# Patient Record
Sex: FEMALE | Race: WHITE | NOT HISPANIC OR LATINO | URBAN - METROPOLITAN AREA
[De-identification: names, ages, dates, MRNs, and addresses within clinical notes are randomized per-mention and may not be internally consistent; named-entity substitution may affect disease eponyms.]

---

## 2019-06-25 PROBLEM — Z00.00 ENCOUNTER FOR PREVENTIVE HEALTH EXAMINATION: Status: ACTIVE | Noted: 2019-06-25

## 2022-07-12 ENCOUNTER — EMERGENCY (EMERGENCY)
Facility: HOSPITAL | Age: 67
LOS: 1 days | Discharge: HOME | End: 2022-07-12
Attending: EMERGENCY MEDICINE
Payer: MEDICARE

## 2022-07-12 VITALS
WEIGHT: 104.06 LBS | OXYGEN SATURATION: 98 % | SYSTOLIC BLOOD PRESSURE: 141 MMHG | TEMPERATURE: 97 F | HEART RATE: 84 BPM | RESPIRATION RATE: 16 BRPM | DIASTOLIC BLOOD PRESSURE: 80 MMHG | HEIGHT: 70 IN

## 2022-07-12 PROCEDURE — 99284 EMERGENCY DEPT VISIT MOD MDM: CPT

## 2022-07-12 RX ORDER — ACETAMINOPHEN 500 MG
650 TABLET ORAL ONCE
Refills: 0 | Status: COMPLETED | OUTPATIENT
Start: 2022-07-12 | End: 2022-07-12

## 2022-07-12 RX ADMIN — Medication 650 MILLIGRAM(S): at 23:46

## 2022-07-12 RX ADMIN — Medication 650 MILLIGRAM(S): at 23:16

## 2022-07-12 NOTE — ED PROVIDER NOTE - OBJECTIVE STATEMENT
67 y.o. female Adult Home female, pt claims was standing up taking off her blouse, pt lost her balance & fell backward onto the linoleum.  c/o Rt sided lower back pain, pt's roommate helped pt up.  Pt also Rt upper arm hematoma, Rt thigh pain.  Pt's able to walk with a walker, pt denies head injury, no LOC

## 2022-07-12 NOTE — ED PROVIDER NOTE - PROGRESS NOTE DETAILS
pt with acute Rt transverse processes of L2-L3, pt states she is currently receiving PT.  Pt declines Ibuprofen/Percocet.  Will d/c to [facility]

## 2022-07-12 NOTE — ED ADULT TRIAGE NOTE - CHIEF COMPLAINT QUOTE
patient states " I fell while I was taking off my shirt, I fell on my side "  denies head injury. denies dizziness

## 2022-07-12 NOTE — ED PROVIDER NOTE - NSICDXPASTMEDICALHX_GEN_ALL_CORE_FT
PAST MEDICAL HISTORY:  GERD (gastroesophageal reflux disease)     H/O personality disorder     H/O: depression     Osteoporosis

## 2022-07-12 NOTE — ED PROVIDER NOTE - PATIENT PORTAL LINK FT
You can access the FollowMyHealth Patient Portal offered by [facility] by registering at the following website: [email or web address]. By joining [employer]’s FollowMyHealth portal, you will also be able to view your health information using other applications (apps) compatible with our system.

## 2022-07-12 NOTE — ED PROVIDER NOTE - MUSCULOSKELETAL, MLM
Spine appears kyphotic, range of motion is not limited, Rt mid/lower back- abrasion, tenderness to palp., mid lower back- sl tenderness to palp., ? Rt CVAT

## 2022-07-12 NOTE — ED PROVIDER NOTE - PHYSICAL EXAMINATION
rt distal/medial aspect upper arm-hematoma, FROM  Rt tight- mid/medial aspect- tenderness to palp., rt distal/medial aspect upper arm-hematoma, FROM  Rt tight- mid/medial aspect-sl. tenderness to palp., not firm to touch

## 2022-07-12 NOTE — ED PROVIDER NOTE - NS ED ATTENDING STATEMENT MOD
Patient transported to room 229 via bed.  Bedside report of procedure and plan of care discussed with JERILYN Donald.  Questions encouraged and answered.  RN in agreement with plan.  Assessment of dressing to left radial site: clean, dry and intact.     Attending Only

## 2022-07-13 VITALS
OXYGEN SATURATION: 97 % | HEART RATE: 83 BPM | DIASTOLIC BLOOD PRESSURE: 78 MMHG | RESPIRATION RATE: 20 BRPM | SYSTOLIC BLOOD PRESSURE: 139 MMHG | TEMPERATURE: 98 F

## 2022-07-13 LAB
APPEARANCE UR: CLEAR — SIGNIFICANT CHANGE UP
BACTERIA # UR AUTO: ABNORMAL /HPF
BILIRUB UR-MCNC: NEGATIVE — SIGNIFICANT CHANGE UP
COLOR SPEC: YELLOW — SIGNIFICANT CHANGE UP
DIFF PNL FLD: ABNORMAL
EPI CELLS # UR: SIGNIFICANT CHANGE UP /HPF
GLUCOSE UR QL: NEGATIVE — SIGNIFICANT CHANGE UP
HYALINE CASTS # UR AUTO: ABNORMAL /LPF
KETONES UR-MCNC: NEGATIVE — SIGNIFICANT CHANGE UP
LEUKOCYTE ESTERASE UR-ACNC: ABNORMAL
NITRITE UR-MCNC: NEGATIVE — SIGNIFICANT CHANGE UP
PH UR: 6 — SIGNIFICANT CHANGE UP (ref 5–8)
PROT UR-MCNC: 15
RBC CASTS # UR COMP ASSIST: ABNORMAL /HPF (ref 0–2)
SP GR SPEC: 1.02 — SIGNIFICANT CHANGE UP (ref 1.01–1.02)
UROBILINOGEN FLD QL: NEGATIVE — SIGNIFICANT CHANGE UP
WBC UR QL: SIGNIFICANT CHANGE UP /HPF (ref 0–5)

## 2022-07-13 PROCEDURE — 72131 CT LUMBAR SPINE W/O DYE: CPT | Mod: MA

## 2022-07-13 PROCEDURE — 72131 CT LUMBAR SPINE W/O DYE: CPT | Mod: 26,MA

## 2022-07-13 PROCEDURE — 81001 URINALYSIS AUTO W/SCOPE: CPT

## 2022-07-13 PROCEDURE — 87086 URINE CULTURE/COLONY COUNT: CPT

## 2022-07-13 PROCEDURE — 73552 X-RAY EXAM OF FEMUR 2/>: CPT | Mod: 26,RT

## 2022-07-13 PROCEDURE — 73552 X-RAY EXAM OF FEMUR 2/>: CPT

## 2022-07-13 PROCEDURE — 87186 SC STD MICRODIL/AGAR DIL: CPT

## 2022-07-13 PROCEDURE — 99284 EMERGENCY DEPT VISIT MOD MDM: CPT | Mod: 25

## 2022-07-13 RX ORDER — ACETAMINOPHEN 500 MG
650 TABLET ORAL ONCE
Refills: 0 | Status: COMPLETED | OUTPATIENT
Start: 2022-07-13 | End: 2022-07-13

## 2022-07-13 RX ADMIN — Medication 650 MILLIGRAM(S): at 05:01

## 2023-03-03 ENCOUNTER — EMERGENCY (EMERGENCY)
Facility: HOSPITAL | Age: 68
LOS: 1 days | Discharge: HOME | End: 2023-03-03
Attending: EMERGENCY MEDICINE
Payer: MEDICARE

## 2023-03-03 VITALS — DIASTOLIC BLOOD PRESSURE: 85 MMHG | SYSTOLIC BLOOD PRESSURE: 156 MMHG

## 2023-03-03 VITALS
SYSTOLIC BLOOD PRESSURE: 129 MMHG | TEMPERATURE: 98 F | HEART RATE: 85 BPM | WEIGHT: 100.09 LBS | RESPIRATION RATE: 18 BRPM | OXYGEN SATURATION: 97 % | HEIGHT: 70 IN | DIASTOLIC BLOOD PRESSURE: 69 MMHG

## 2023-03-03 PROBLEM — Z86.59 PERSONAL HISTORY OF OTHER MENTAL AND BEHAVIORAL DISORDERS: Chronic | Status: ACTIVE | Noted: 2022-07-12

## 2023-03-03 PROBLEM — M81.0 AGE-RELATED OSTEOPOROSIS WITHOUT CURRENT PATHOLOGICAL FRACTURE: Chronic | Status: ACTIVE | Noted: 2022-07-12

## 2023-03-03 PROBLEM — K21.9 GASTRO-ESOPHAGEAL REFLUX DISEASE WITHOUT ESOPHAGITIS: Chronic | Status: ACTIVE | Noted: 2022-07-12

## 2023-03-03 LAB
ALBUMIN SERPL ELPH-MCNC: 3.4 G/DL — LOW (ref 3.5–5)
ALP SERPL-CCNC: 91 U/L — SIGNIFICANT CHANGE UP (ref 40–120)
ALT FLD-CCNC: 19 U/L DA — SIGNIFICANT CHANGE UP (ref 10–60)
ANION GAP SERPL CALC-SCNC: 1 MMOL/L — LOW (ref 5–17)
AST SERPL-CCNC: 22 U/L — SIGNIFICANT CHANGE UP (ref 10–40)
BASOPHILS # BLD AUTO: 0.03 K/UL — SIGNIFICANT CHANGE UP (ref 0–0.2)
BASOPHILS NFR BLD AUTO: 0.6 % — SIGNIFICANT CHANGE UP (ref 0–2)
BILIRUB SERPL-MCNC: 0.3 MG/DL — SIGNIFICANT CHANGE UP (ref 0.2–1.2)
BUN SERPL-MCNC: 21 MG/DL — HIGH (ref 7–18)
CALCIUM SERPL-MCNC: 9.2 MG/DL — SIGNIFICANT CHANGE UP (ref 8.4–10.5)
CHLORIDE SERPL-SCNC: 111 MMOL/L — HIGH (ref 96–108)
CO2 SERPL-SCNC: 30 MMOL/L — SIGNIFICANT CHANGE UP (ref 22–31)
CREAT SERPL-MCNC: 0.64 MG/DL — SIGNIFICANT CHANGE UP (ref 0.5–1.3)
EGFR: 96 ML/MIN/1.73M2 — SIGNIFICANT CHANGE UP
EOSINOPHIL # BLD AUTO: 0.15 K/UL — SIGNIFICANT CHANGE UP (ref 0–0.5)
EOSINOPHIL NFR BLD AUTO: 2.8 % — SIGNIFICANT CHANGE UP (ref 0–6)
FLUAV AG NPH QL: SIGNIFICANT CHANGE UP
FLUBV AG NPH QL: SIGNIFICANT CHANGE UP
GLUCOSE SERPL-MCNC: 98 MG/DL — SIGNIFICANT CHANGE UP (ref 70–99)
HCT VFR BLD CALC: 36 % — SIGNIFICANT CHANGE UP (ref 34.5–45)
HGB BLD-MCNC: 11.3 G/DL — LOW (ref 11.5–15.5)
IMM GRANULOCYTES NFR BLD AUTO: 0.4 % — SIGNIFICANT CHANGE UP (ref 0–0.9)
LYMPHOCYTES # BLD AUTO: 1.17 K/UL — SIGNIFICANT CHANGE UP (ref 1–3.3)
LYMPHOCYTES # BLD AUTO: 22.1 % — SIGNIFICANT CHANGE UP (ref 13–44)
MCHC RBC-ENTMCNC: 29.8 PG — SIGNIFICANT CHANGE UP (ref 27–34)
MCHC RBC-ENTMCNC: 31.4 GM/DL — LOW (ref 32–36)
MCV RBC AUTO: 95 FL — SIGNIFICANT CHANGE UP (ref 80–100)
MONOCYTES # BLD AUTO: 0.32 K/UL — SIGNIFICANT CHANGE UP (ref 0–0.9)
MONOCYTES NFR BLD AUTO: 6 % — SIGNIFICANT CHANGE UP (ref 2–14)
NEUTROPHILS # BLD AUTO: 3.61 K/UL — SIGNIFICANT CHANGE UP (ref 1.8–7.4)
NEUTROPHILS NFR BLD AUTO: 68.1 % — SIGNIFICANT CHANGE UP (ref 43–77)
NRBC # BLD: 0 /100 WBCS — SIGNIFICANT CHANGE UP (ref 0–0)
PLATELET # BLD AUTO: 155 K/UL — SIGNIFICANT CHANGE UP (ref 150–400)
POTASSIUM SERPL-MCNC: 3.9 MMOL/L — SIGNIFICANT CHANGE UP (ref 3.5–5.3)
POTASSIUM SERPL-SCNC: 3.9 MMOL/L — SIGNIFICANT CHANGE UP (ref 3.5–5.3)
PROT SERPL-MCNC: 7 G/DL — SIGNIFICANT CHANGE UP (ref 6–8.3)
RBC # BLD: 3.79 M/UL — LOW (ref 3.8–5.2)
RBC # FLD: 13 % — SIGNIFICANT CHANGE UP (ref 10.3–14.5)
SARS-COV-2 RNA SPEC QL NAA+PROBE: SIGNIFICANT CHANGE UP
SODIUM SERPL-SCNC: 142 MMOL/L — SIGNIFICANT CHANGE UP (ref 135–145)
WBC # BLD: 5.3 K/UL — SIGNIFICANT CHANGE UP (ref 3.8–10.5)
WBC # FLD AUTO: 5.3 K/UL — SIGNIFICANT CHANGE UP (ref 3.8–10.5)

## 2023-03-03 PROCEDURE — 99284 EMERGENCY DEPT VISIT MOD MDM: CPT

## 2023-03-03 PROCEDURE — 85025 COMPLETE CBC W/AUTO DIFF WBC: CPT

## 2023-03-03 PROCEDURE — 73590 X-RAY EXAM OF LOWER LEG: CPT

## 2023-03-03 PROCEDURE — 80053 COMPREHEN METABOLIC PANEL: CPT

## 2023-03-03 PROCEDURE — 87637 SARSCOV2&INF A&B&RSV AMP PRB: CPT

## 2023-03-03 PROCEDURE — 73590 X-RAY EXAM OF LOWER LEG: CPT | Mod: 26,RT

## 2023-03-03 PROCEDURE — 36415 COLL VENOUS BLD VENIPUNCTURE: CPT

## 2023-03-03 RX ADMIN — Medication 100 MILLIGRAM(S): at 18:53

## 2023-03-03 NOTE — ED ADULT NURSE NOTE - OBJECTIVE STATEMENT
Pt is here for right leg ulcer. Pt is from nursing home referred by Dr. Molina for admission. AAOx4. GCS15. Pt is ambulatory with a walker.

## 2023-03-03 NOTE — CHART NOTE - NSCHARTNOTEFT_GEN_A_CORE
Verbal referral " return to [facility]  Pt is a 67 year old female who was sent to the ED from [facility] with complaint of a non-healing ulcer in right lower extremity, anterior and lateral to the tibia. Pt was referred to the ED by Dr Molina Pt was treated and released from the ED. Per Dr Menendez, Pt's antibiotics will be sent to the facility tomorrow, Pt do not need the antibiotics tonight. Justen outreached the facility ([phone]) and informed Showanda of  Pt's d/c and transfer back to the facility. She was also informed that  Pt's  antibiotics would be sent to the facility tomorrow by Dr Molina.   Dr Molina updated. Verbal referral " return to [facility]  Pt is a 67 year old female who was sent to the ED from [facility] with complaint of a non-healing ulcer in right lower extremity, anterior and lateral to the tibia. Pt was referred to the ED by Dr Molina. Pt was treated and released from the ED. Per Dr Menendez, Pt's antibiotics will be sent to the facility tomorrow, Pt do not need the antibiotics tonight. Justen outreached the facility () and informed Showanda of  Pt's d/c and transfer back to the facility. She was also informed that  Pt's  antibiotics would be sent to the facility tomorrow by Dr Molina.   Dr Molina updated. Verbal referral " return to [facility]  Pt is a 67 year old female who was sent to the ED from [facility] with complaint of a non-healing ulcer in right lower extremity, anterior and lateral to the tibia. Pt was referred to the ED by Dr Molina. Pt was treated and released from the ED. Per Dr Menendez, Pt's antibiotics will be sent to the facility tomorrow, Pt do not need the antibiotics tonight. Justen outreached the facility (), informed Showanda of  Pt's d/c and transfer back to the facility. She was also informed that  Pt's  antibiotics would be sent to the facility tomorrow by Dr Molina.   Dr Molina updated.

## 2023-03-03 NOTE — ED ADULT NURSE NOTE - PRO INTERPRETER NEED 2
Patient was not available for their therapy session at this time. Pt approached for OT session. Pt encouraged to participate, however states, \"You're going to have to try tomorrow. It's too late today.\" Pt reports increased activity this date with PT and states he is significantly fatigued. Will re-attempt tomorrow. Reason not seen: Patient requesting no therapy today (11/05/18 1518).    Re-Attempt Plan: Will re-attempt tomorrow (11/05/18 1518).     English

## 2023-03-03 NOTE — ED PROVIDER NOTE - PATIENT PORTAL LINK FT
You can access the FollowMyHealth Patient Portal offered by [facility] by registering at the following website: [email or web address]. By joining [employer]’s FollowMyHealth portal, you will also be able to view your health information using other applications (apps) compatible with our system.

## 2023-03-03 NOTE — ED ADULT NURSE NOTE - NS_SISCREENINGSR_GEN_ALL_ED
Reason for Outgoing call:    Returning patient call.       Patients Questions/Concerns:    Patient is inquiring about the results of his CT scan.  He is also wanting to let Dr. Modi know that he has been running a of 102.0 - 101.0 which occurs mostly after eating but is resolved with Tylenol. Currently denies any fever and last recorded one was yesterday. He continues to have the night sweats where he is changing his under shirt 3-4 times a night. He denies any signs of infection or any new aches/pains.    Nursing Action/Patient Instruction:    Informed patient that Dr. Modi will be reviewing his scan tomorrow and that someone will be calling him with the results and plan.    Patient Response/Evaluation:    Patient verbalized understanding of plan and will look forward to hearing back from someone           Negative

## 2023-03-03 NOTE — ED PROVIDER NOTE - OBJECTIVE STATEMENT
67 year old female with no pertinent medical history presents to ED complaining of a non-healing ulcer in right lower extremity, anterior and lateral to the tibia. She reports sustaining an injury in Feb which had been healing, but the scab came off. She saw Dr Molina who referred her here for admission. OCTAVIA.

## 2023-03-15 ENCOUNTER — INPATIENT (INPATIENT)
Facility: HOSPITAL | Age: 68
LOS: 4 days | Discharge: NURSING, REHAB OR LONG-TERM CARE | DRG: 602 | End: 2023-03-20
Attending: INTERNAL MEDICINE | Admitting: INTERNAL MEDICINE
Payer: MEDICARE

## 2023-03-15 VITALS
OXYGEN SATURATION: 100 % | WEIGHT: 110.01 LBS | SYSTOLIC BLOOD PRESSURE: 124 MMHG | DIASTOLIC BLOOD PRESSURE: 81 MMHG | TEMPERATURE: 99 F | RESPIRATION RATE: 16 BRPM | HEART RATE: 87 BPM | HEIGHT: 70 IN

## 2023-03-15 DIAGNOSIS — L03.90 CELLULITIS, UNSPECIFIED: ICD-10-CM

## 2023-03-15 LAB
ALBUMIN SERPL ELPH-MCNC: 3.1 G/DL — LOW (ref 3.5–5)
ALP SERPL-CCNC: 92 U/L — SIGNIFICANT CHANGE UP (ref 40–120)
ALT FLD-CCNC: 15 U/L DA — SIGNIFICANT CHANGE UP (ref 10–60)
ANION GAP SERPL CALC-SCNC: 4 MMOL/L — LOW (ref 5–17)
AST SERPL-CCNC: 17 U/L — SIGNIFICANT CHANGE UP (ref 10–40)
BASOPHILS # BLD AUTO: 0.02 K/UL — SIGNIFICANT CHANGE UP (ref 0–0.2)
BASOPHILS NFR BLD AUTO: 0.3 % — SIGNIFICANT CHANGE UP (ref 0–2)
BILIRUB SERPL-MCNC: 0.4 MG/DL — SIGNIFICANT CHANGE UP (ref 0.2–1.2)
BUN SERPL-MCNC: 15 MG/DL — SIGNIFICANT CHANGE UP (ref 7–18)
CALCIUM SERPL-MCNC: 8.7 MG/DL — SIGNIFICANT CHANGE UP (ref 8.4–10.5)
CHLORIDE SERPL-SCNC: 108 MMOL/L — SIGNIFICANT CHANGE UP (ref 96–108)
CO2 SERPL-SCNC: 31 MMOL/L — SIGNIFICANT CHANGE UP (ref 22–31)
CREAT SERPL-MCNC: 0.63 MG/DL — SIGNIFICANT CHANGE UP (ref 0.5–1.3)
EGFR: 96 ML/MIN/1.73M2 — SIGNIFICANT CHANGE UP
EOSINOPHIL # BLD AUTO: 0.18 K/UL — SIGNIFICANT CHANGE UP (ref 0–0.5)
EOSINOPHIL NFR BLD AUTO: 3.1 % — SIGNIFICANT CHANGE UP (ref 0–6)
FLUAV AG NPH QL: SIGNIFICANT CHANGE UP
FLUBV AG NPH QL: SIGNIFICANT CHANGE UP
GLUCOSE SERPL-MCNC: 99 MG/DL — SIGNIFICANT CHANGE UP (ref 70–99)
HCT VFR BLD CALC: 35.2 % — SIGNIFICANT CHANGE UP (ref 34.5–45)
HGB BLD-MCNC: 10.9 G/DL — LOW (ref 11.5–15.5)
IMM GRANULOCYTES NFR BLD AUTO: 0.3 % — SIGNIFICANT CHANGE UP (ref 0–0.9)
LYMPHOCYTES # BLD AUTO: 1.83 K/UL — SIGNIFICANT CHANGE UP (ref 1–3.3)
LYMPHOCYTES # BLD AUTO: 31.4 % — SIGNIFICANT CHANGE UP (ref 13–44)
MCHC RBC-ENTMCNC: 29.3 PG — SIGNIFICANT CHANGE UP (ref 27–34)
MCHC RBC-ENTMCNC: 31 GM/DL — LOW (ref 32–36)
MCV RBC AUTO: 94.6 FL — SIGNIFICANT CHANGE UP (ref 80–100)
MONOCYTES # BLD AUTO: 0.37 K/UL — SIGNIFICANT CHANGE UP (ref 0–0.9)
MONOCYTES NFR BLD AUTO: 6.4 % — SIGNIFICANT CHANGE UP (ref 2–14)
NEUTROPHILS # BLD AUTO: 3.4 K/UL — SIGNIFICANT CHANGE UP (ref 1.8–7.4)
NEUTROPHILS NFR BLD AUTO: 58.5 % — SIGNIFICANT CHANGE UP (ref 43–77)
NRBC # BLD: 0 /100 WBCS — SIGNIFICANT CHANGE UP (ref 0–0)
PLATELET # BLD AUTO: 177 K/UL — SIGNIFICANT CHANGE UP (ref 150–400)
POTASSIUM SERPL-MCNC: 3 MMOL/L — LOW (ref 3.5–5.3)
POTASSIUM SERPL-SCNC: 3 MMOL/L — LOW (ref 3.5–5.3)
PROT SERPL-MCNC: 6.8 G/DL — SIGNIFICANT CHANGE UP (ref 6–8.3)
RBC # BLD: 3.72 M/UL — LOW (ref 3.8–5.2)
RBC # FLD: 12.8 % — SIGNIFICANT CHANGE UP (ref 10.3–14.5)
SARS-COV-2 RNA SPEC QL NAA+PROBE: SIGNIFICANT CHANGE UP
SODIUM SERPL-SCNC: 143 MMOL/L — SIGNIFICANT CHANGE UP (ref 135–145)
WBC # BLD: 5.82 K/UL — SIGNIFICANT CHANGE UP (ref 3.8–10.5)
WBC # FLD AUTO: 5.82 K/UL — SIGNIFICANT CHANGE UP (ref 3.8–10.5)

## 2023-03-15 PROCEDURE — 99285 EMERGENCY DEPT VISIT HI MDM: CPT

## 2023-03-15 RX ORDER — CEFAZOLIN SODIUM 1 G
1000 VIAL (EA) INJECTION ONCE
Refills: 0 | Status: COMPLETED | OUTPATIENT
Start: 2023-03-15 | End: 2023-03-15

## 2023-03-15 NOTE — ED ADULT NURSE NOTE - CHIEF COMPLAINT QUOTE
As per EMS report, sent from [facility] pt, c/o BL LE redness w/ swelling and L foot 5th digit pain w/ serosanguinous drainage noted today. RLE and L foot 5th digit bandage w/ serosanguinous drainage noted. Pt denies all other symptoms.

## 2023-03-15 NOTE — ED PROVIDER NOTE - CLINICAL SUMMARY MEDICAL DECISION MAKING FREE TEXT BOX
67-year-old female presenting with bilateral leg redness.  EXTR lower extremities appear cellulitic.  Patient reports she was sent to the hospital for admission and appears to have failed outpatient treatment for cellulitis.  Will get labs and give antibiotics.  Will reassess. will consult Dr. Bach for dispo. 67-year-old female presenting with bilateral leg redness.  EXTR lower extremities appear cellulitic.  Patient reports she was sent to the hospital for admission and appears to have failed outpatient treatment for cellulitis.  Will get labs and give antibiotics.  Will reassess. will consult Dr. Bach for dispo.    spoke with Dr. Bach. patient to be admitted for vascular workup.

## 2023-03-15 NOTE — ED PROVIDER NOTE - PHYSICAL EXAMINATION
General: well appearing female, no acute distress   HEENT: normocephalic, atraumatic   Respiratory: normal work of breathing  MSK: bilateral leg erythema, serosanguinous drainage from left 2nd toe and right calf, no edema,  non-tender    Skin: warm, dry   Neuro: A&Ox3  Psych: appropriate affect

## 2023-03-15 NOTE — ED ADULT NURSE NOTE - OBJECTIVE STATEMENT
pt alert and oriented able to make all needs known, rr even and unlabored, noted redness on bilateral LE, abdomen non distended, pt is continent, a,mbulatory with walker at baseline

## 2023-03-15 NOTE — ED ADULT TRIAGE NOTE - CHIEF COMPLAINT QUOTE
As per EMS report, sent from [facility] pt, c/o L foot 5th digit pain w/ serosanguinous drainage noted today. RLE and L foot 5th digit bandage w/ serosanguinous drainage noted. Pt denies all other symptoms. As per EMS report, sent from [facility] pt, c/o BL LE redness w/ swelling and L foot 5th digit pain w/ serosanguinous drainage noted today. RLE and L foot 5th digit bandage w/ serosanguinous drainage noted. Pt denies all other symptoms.

## 2023-03-15 NOTE — PHARMACOTHERAPY INTERVENTION NOTE - COMMENTS
Patient is from [facility] ([address] [facility]) and its medical record was used to update the outside medication record.

## 2023-03-15 NOTE — ED PROVIDER NOTE - OBJECTIVE STATEMENT
67-year-old female presenting with bilateral leg pain, redness and swelling.  Patient reports recent recently treated for cellulitis but symptoms not improved.  Denies any fever, chest pain, trouble breathing.

## 2023-03-16 DIAGNOSIS — M81.0 AGE-RELATED OSTEOPOROSIS WITHOUT CURRENT PATHOLOGICAL FRACTURE: ICD-10-CM

## 2023-03-16 DIAGNOSIS — K21.9 GASTRO-ESOPHAGEAL REFLUX DISEASE WITHOUT ESOPHAGITIS: ICD-10-CM

## 2023-03-16 DIAGNOSIS — Z29.9 ENCOUNTER FOR PROPHYLACTIC MEASURES, UNSPECIFIED: ICD-10-CM

## 2023-03-16 DIAGNOSIS — E87.6 HYPOKALEMIA: ICD-10-CM

## 2023-03-16 DIAGNOSIS — L03.90 CELLULITIS, UNSPECIFIED: ICD-10-CM

## 2023-03-16 LAB
ANION GAP SERPL CALC-SCNC: 7 MMOL/L — SIGNIFICANT CHANGE UP (ref 5–17)
BUN SERPL-MCNC: 12 MG/DL — SIGNIFICANT CHANGE UP (ref 7–18)
CALCIUM SERPL-MCNC: 8.8 MG/DL — SIGNIFICANT CHANGE UP (ref 8.4–10.5)
CHLORIDE SERPL-SCNC: 107 MMOL/L — SIGNIFICANT CHANGE UP (ref 96–108)
CO2 SERPL-SCNC: 30 MMOL/L — SIGNIFICANT CHANGE UP (ref 22–31)
CREAT SERPL-MCNC: 0.57 MG/DL — SIGNIFICANT CHANGE UP (ref 0.5–1.3)
EGFR: 98 ML/MIN/1.73M2 — SIGNIFICANT CHANGE UP
GLUCOSE SERPL-MCNC: 103 MG/DL — HIGH (ref 70–99)
HCT VFR BLD CALC: 35.6 % — SIGNIFICANT CHANGE UP (ref 34.5–45)
HGB BLD-MCNC: 11.2 G/DL — LOW (ref 11.5–15.5)
MCHC RBC-ENTMCNC: 29.6 PG — SIGNIFICANT CHANGE UP (ref 27–34)
MCHC RBC-ENTMCNC: 31.5 GM/DL — LOW (ref 32–36)
MCV RBC AUTO: 93.9 FL — SIGNIFICANT CHANGE UP (ref 80–100)
MRSA PCR RESULT.: SIGNIFICANT CHANGE UP
NRBC # BLD: 0 /100 WBCS — SIGNIFICANT CHANGE UP (ref 0–0)
PLATELET # BLD AUTO: 201 K/UL — SIGNIFICANT CHANGE UP (ref 150–400)
POTASSIUM SERPL-MCNC: 3.7 MMOL/L — SIGNIFICANT CHANGE UP (ref 3.5–5.3)
POTASSIUM SERPL-SCNC: 3.7 MMOL/L — SIGNIFICANT CHANGE UP (ref 3.5–5.3)
RBC # BLD: 3.79 M/UL — LOW (ref 3.8–5.2)
RBC # FLD: 12.7 % — SIGNIFICANT CHANGE UP (ref 10.3–14.5)
S AUREUS DNA NOSE QL NAA+PROBE: SIGNIFICANT CHANGE UP
SODIUM SERPL-SCNC: 144 MMOL/L — SIGNIFICANT CHANGE UP (ref 135–145)
WBC # BLD: 5.52 K/UL — SIGNIFICANT CHANGE UP (ref 3.8–10.5)
WBC # FLD AUTO: 5.52 K/UL — SIGNIFICANT CHANGE UP (ref 3.8–10.5)

## 2023-03-16 PROCEDURE — 93923 UPR/LXTR ART STDY 3+ LVLS: CPT | Mod: 26

## 2023-03-16 RX ORDER — LANOLIN ALCOHOL/MO/W.PET/CERES
3 CREAM (GRAM) TOPICAL AT BEDTIME
Refills: 0 | Status: DISCONTINUED | OUTPATIENT
Start: 2023-03-16 | End: 2023-03-20

## 2023-03-16 RX ORDER — ENOXAPARIN SODIUM 100 MG/ML
40 INJECTION SUBCUTANEOUS EVERY 24 HOURS
Refills: 0 | Status: DISCONTINUED | OUTPATIENT
Start: 2023-03-16 | End: 2023-03-20

## 2023-03-16 RX ORDER — PANTOPRAZOLE SODIUM 20 MG/1
40 TABLET, DELAYED RELEASE ORAL
Refills: 0 | Status: DISCONTINUED | OUTPATIENT
Start: 2023-03-16 | End: 2023-03-20

## 2023-03-16 RX ORDER — ASPIRIN/CALCIUM CARB/MAGNESIUM 324 MG
81 TABLET ORAL DAILY
Refills: 0 | Status: DISCONTINUED | OUTPATIENT
Start: 2023-03-16 | End: 2023-03-20

## 2023-03-16 RX ORDER — AMPICILLIN SODIUM AND SULBACTAM SODIUM 250; 125 MG/ML; MG/ML
3 INJECTION, POWDER, FOR SUSPENSION INTRAMUSCULAR; INTRAVENOUS EVERY 6 HOURS
Refills: 0 | Status: DISCONTINUED | OUTPATIENT
Start: 2023-03-16 | End: 2023-03-20

## 2023-03-16 RX ORDER — POTASSIUM CHLORIDE 20 MEQ
40 PACKET (EA) ORAL EVERY 4 HOURS
Refills: 0 | Status: COMPLETED | OUTPATIENT
Start: 2023-03-16 | End: 2023-03-16

## 2023-03-16 RX ORDER — CHOLECALCIFEROL (VITAMIN D3) 125 MCG
1000 CAPSULE ORAL DAILY
Refills: 0 | Status: DISCONTINUED | OUTPATIENT
Start: 2023-03-16 | End: 2023-03-20

## 2023-03-16 RX ORDER — ONDANSETRON 8 MG/1
4 TABLET, FILM COATED ORAL EVERY 8 HOURS
Refills: 0 | Status: DISCONTINUED | OUTPATIENT
Start: 2023-03-16 | End: 2023-03-20

## 2023-03-16 RX ORDER — CEFAZOLIN SODIUM 1 G
1000 VIAL (EA) INJECTION EVERY 8 HOURS
Refills: 0 | Status: DISCONTINUED | OUTPATIENT
Start: 2023-03-16 | End: 2023-03-16

## 2023-03-16 RX ORDER — ACETAMINOPHEN 500 MG
650 TABLET ORAL EVERY 6 HOURS
Refills: 0 | Status: DISCONTINUED | OUTPATIENT
Start: 2023-03-16 | End: 2023-03-20

## 2023-03-16 RX ORDER — COLLAGENASE CLOSTRIDIUM HIST. 250 UNIT/G
1 OINTMENT (GRAM) TOPICAL DAILY
Refills: 0 | Status: DISCONTINUED | OUTPATIENT
Start: 2023-03-16 | End: 2023-03-20

## 2023-03-16 RX ADMIN — Medication 1 TABLET(S): at 12:03

## 2023-03-16 RX ADMIN — AMPICILLIN SODIUM AND SULBACTAM SODIUM 200 GRAM(S): 250; 125 INJECTION, POWDER, FOR SUSPENSION INTRAMUSCULAR; INTRAVENOUS at 12:04

## 2023-03-16 RX ADMIN — Medication 81 MILLIGRAM(S): at 12:03

## 2023-03-16 RX ADMIN — ENOXAPARIN SODIUM 40 MILLIGRAM(S): 100 INJECTION SUBCUTANEOUS at 07:04

## 2023-03-16 RX ADMIN — PANTOPRAZOLE SODIUM 40 MILLIGRAM(S): 20 TABLET, DELAYED RELEASE ORAL at 07:04

## 2023-03-16 RX ADMIN — Medication 40 MILLIEQUIVALENT(S): at 12:02

## 2023-03-16 RX ADMIN — AMPICILLIN SODIUM AND SULBACTAM SODIUM 200 GRAM(S): 250; 125 INJECTION, POWDER, FOR SUSPENSION INTRAMUSCULAR; INTRAVENOUS at 23:39

## 2023-03-16 RX ADMIN — Medication 1000 UNIT(S): at 13:01

## 2023-03-16 RX ADMIN — AMPICILLIN SODIUM AND SULBACTAM SODIUM 200 GRAM(S): 250; 125 INJECTION, POWDER, FOR SUSPENSION INTRAMUSCULAR; INTRAVENOUS at 17:32

## 2023-03-16 RX ADMIN — Medication 40 MILLIEQUIVALENT(S): at 07:04

## 2023-03-16 RX ADMIN — Medication 1 APPLICATION(S): at 23:41

## 2023-03-16 NOTE — H&P ADULT - NSVTERISKREFERASSESS_GEN_ALL_CORE
Refer to the Assessment tab to view/cancel completed assessment. Double O-Z Plasty Text: The defect edges were debeveled with a #15 scalpel blade.  Given the location of the defect, shape of the defect and the proximity to free margins a Double O-Z plasty (double transposition flap) was deemed most appropriate.  Using a sterile surgical marker, the appropriate transposition flaps were drawn incorporating the defect and placing the expected incisions within the relaxed skin tension lines where possible. The area thus outlined was incised deep to adipose tissue with a #15 scalpel blade.  The skin margins were undermined to an appropriate distance in all directions utilizing iris scissors.  Hemostasis was achieved with electrocautery.  The flaps were then transposed into place, one clockwise and the other counterclockwise, and anchored with interrupted buried subcutaneous sutures.

## 2023-03-16 NOTE — CONSULT NOTE ADULT - SKIN COMMENTS
erythema , warmth and tenderness over the mid right  lower leg , 2 superficial ulcers over the area which is the likely entry point of her infection

## 2023-03-16 NOTE — PROGRESS NOTE ADULT - SUBJECTIVE AND OBJECTIVE BOX
NP Note discussed with  primary attending    Patient is a 67y old  Female who presents with a chief complaint of Cellulitis (16 Mar 2023 01:39)      INTERVAL HPI/OVERNIGHT EVENTS: no new complaints    MEDICATIONS  (STANDING):  ampicillin/sulbactam  IVPB 3 Gram(s) IV Intermittent every 6 hours  aspirin enteric coated 81 milliGRAM(s) Oral daily  cholecalciferol 1000 Unit(s) Oral daily  collagenase Ointment 1 Application(s) Topical daily  enoxaparin Injectable 40 milliGRAM(s) SubCutaneous every 24 hours  multivitamin 1 Tablet(s) Oral daily  pantoprazole    Tablet 40 milliGRAM(s) Oral before breakfast    MEDICATIONS  (PRN):  acetaminophen     Tablet .. 650 milliGRAM(s) Oral every 6 hours PRN Temp greater or equal to 38C (100.4F), Mild Pain (1 - 3)  aluminum hydroxide/magnesium hydroxide/simethicone Suspension 30 milliLiter(s) Oral every 4 hours PRN Dyspepsia  melatonin 3 milliGRAM(s) Oral at bedtime PRN Insomnia  ondansetron Injectable 4 milliGRAM(s) IV Push every 8 hours PRN Nausea and/or Vomiting      __________________________________________________  REVIEW OF SYSTEMS:    CONSTITUTIONAL: No fever,   EYES: no acute visual disturbances  NECK: No pain or stiffness  RESPIRATORY: No cough; No shortness of breath  CARDIOVASCULAR: No chest pain, no palpitations  GASTROINTESTINAL: No pain. No nausea or vomiting; No diarrhea   NEUROLOGICAL: No headache or numbness, no tremors  MUSCULOSKELETAL: No joint pain, no muscle pain  GENITOURINARY: no dysuria, no frequency, no hesitancy  PSYCHIATRY: no depression , no anxiety  ALL OTHER  ROS negative        Vital Signs Last 24 Hrs  T(C): 36.8 (16 Mar 2023 13:32), Max: 37.2 (15 Mar 2023 16:48)  T(F): 98.2 (16 Mar 2023 13:32), Max: 98.9 (15 Mar 2023 16:48)  HR: 87 (16 Mar 2023 13:32) (68 - 114)  BP: 116/77 (16 Mar 2023 13:32) (116/77 - 138/75)  BP(mean): --  RR: 18 (16 Mar 2023 13:32) (16 - 18)  SpO2: 99% (16 Mar 2023 13:32) (98% - 100%)    Parameters below as of 16 Mar 2023 13:32  Patient On (Oxygen Delivery Method): room air        ________________________________________________  PHYSICAL EXAM:  GENERAL: NAD  HEENT: Normocephalic;  conjunctivae and sclerae clear; moist mucous membranes;   NECK : supple  CHEST/LUNG: Clear to ausculitation bilaterally with good air entry   HEART: S1 S2  regular; no murmurs, gallops or rubs  ABDOMEN: Soft, Nontender, Nondistended; Bowel sounds present  EXTREMITIES: B/L LE ulcers, redness   SKIN: warm and dry; LE cellulitis   NERVOUS SYSTEM:  Awake and alert; Oriented  to place, person and time ; anxious     _________________________________________________  LABS:                        11.2   5.52  )-----------( 201      ( 16 Mar 2023 12:26 )             35.6     03-16    144  |  107  |  12  ----------------------------<  103<H>  3.7   |  30  |  0.57    Ca    8.8      16 Mar 2023 12:26    TPro  6.8  /  Alb  3.1<L>  /  TBili  0.4  /  DBili  x   /  AST  17  /  ALT  15  /  AlkPhos  92  03-15        CAPILLARY BLOOD GLUCOSE            RADIOLOGY & ADDITIONAL TESTS:    < from: VA Physiol Extremity Lower 3+ Level, BI (03.16.23 @ 11:24) >  ACC: [id number] EXAM:  US PHYSIOL LWR EXT 3+ LEV BI   ORDERED BY: DEVONTE ZAMAN     PROCEDURE DATE:  03/16/2023          INTERPRETATION:  Clinical information: Nonsmoker, nondiabetic, presents   with cellulitis and nonhealing ulcer left lower extremity.    COMPARISON: None available.    TECHNIQUE: Lower extremity arterial Doppler study.    FINDINGS: Ankle brachial index measures 1.26 on the right and 1.21 on the   left. No segmental arterial pressure gradient is detected.    PVR waveforms are normal in amplitude and pulsatility throughout both   lower extremities.    IMPRESSION: No Doppler evidence of hemodynamically significant arterial   flow limitation in either lower extremity.    < end of copied text >  < from: Xray Tibia + Fibula 2 Views, Right (03.03.23 @ 16:36) >  ACC: [id number] EXAM:  XR TIB FIB AP LAT 2 VIEWS RT   ORDERED BY: QUETA FRIEDMAN     PROCEDURE DATE:  03/03/2023          INTERPRETATION:  Radiographs of the RIGHT tibia and fibula    CLINICAL INFORMATION: RIGHT lower extremity soft tissue ulceration..    TECHNIQUE:  Frontal and lateral views of the tibia and fibula were   obtained.    FINDINGS:  No prior studies are available for review.    The tibia and fibula are intact.  No fracture or gross osseous lytic/ blastic lesion..  No subcutaneous air or radiopaque foreign body. No radiographic evidence   of osteomyelitis..    IMPRESSION:   No acute radiographic osseous pathology..      < end of copied text >      Imaging Personally Reviewed:  YES    Consultant(s) Notes Reviewed:   YES    Care Discussed with Consultants :     Plan of care was discussed with patient and /or primary care giver; all questions and concerns were addressed and care was aligned with patient's wishes.

## 2023-03-16 NOTE — CONSULT NOTE ADULT - SUBJECTIVE AND OBJECTIVE BOX
Podiatry HPI: Patient is a 67y old  Female who presents with a chief complaint of Cellulitis (16 Mar 2023 01:39). Patient reports no PMH except Anemia. She was referred to the ED because of a superficial wound to her right anteromedial distal leg. She is not sure how long the wound has been there. Patient also has a superficial wound to the 5th digit at her left foot. She admits no pain. No constitutional symptoms. No other pedal concerns. Patient does not like to have dressings on her feet or legs but agrees to a dressing application today.      HPI:  This is a 67 year old female, coming from [facility], with no significant medical history coming in for b/l LE ulcers. She states she has had chronic ulcers that have been getting worse. She was being treated with doxy in the assisted living but her cellulitis did not improve. She continues to have painful ulcers on the lower extremities She denies any headaches, visual disturbances, N/V/D, dizziness, falls, chest pain, palpitations, lower extremity swelling, fevers, skin rash, recent travel, or sick contacts   (16 Mar 2023 01:39)      PMH: GERD (gastroesophageal reflux disease)    H/O: depression    Osteoporosis    H/O personality disorder      Allergies: No Known Allergies    Medications: acetaminophen     Tablet .. 650 milliGRAM(s) Oral every 6 hours PRN  aluminum hydroxide/magnesium hydroxide/simethicone Suspension 30 milliLiter(s) Oral every 4 hours PRN  ampicillin/sulbactam  IVPB 3 Gram(s) IV Intermittent every 6 hours  aspirin enteric coated 81 milliGRAM(s) Oral daily  cholecalciferol 1000 Unit(s) Oral daily  collagenase Ointment 1 Application(s) Topical daily  enoxaparin Injectable 40 milliGRAM(s) SubCutaneous every 24 hours  melatonin 3 milliGRAM(s) Oral at bedtime PRN  multivitamin 1 Tablet(s) Oral daily  ondansetron Injectable 4 milliGRAM(s) IV Push every 8 hours PRN  pantoprazole    Tablet 40 milliGRAM(s) Oral before breakfast    FH:No pertinent family history in first degree relatives      PSX: No significant past surgical history      SH: acetaminophen     Tablet .. 650 milliGRAM(s) Oral every 6 hours PRN  aluminum hydroxide/magnesium hydroxide/simethicone Suspension 30 milliLiter(s) Oral every 4 hours PRN  ampicillin/sulbactam  IVPB 3 Gram(s) IV Intermittent every 6 hours  aspirin enteric coated 81 milliGRAM(s) Oral daily  cholecalciferol 1000 Unit(s) Oral daily  collagenase Ointment 1 Application(s) Topical daily  enoxaparin Injectable 40 milliGRAM(s) SubCutaneous every 24 hours  melatonin 3 milliGRAM(s) Oral at bedtime PRN  multivitamin 1 Tablet(s) Oral daily  ondansetron Injectable 4 milliGRAM(s) IV Push every 8 hours PRN  pantoprazole    Tablet 40 milliGRAM(s) Oral before breakfast      Vital Signs Last 24 Hrs  T(C): 36.8 (16 Mar 2023 13:32), Max: 37.2 (15 Mar 2023 16:48)  T(F): 98.2 (16 Mar 2023 13:32), Max: 98.9 (15 Mar 2023 16:48)  HR: 87 (16 Mar 2023 13:32) (68 - 114)  BP: 116/77 (16 Mar 2023 13:32) (116/77 - 138/75)  BP(mean): --  RR: 18 (16 Mar 2023 13:32) (16 - 18)  SpO2: 99% (16 Mar 2023 13:32) (98% - 100%)    Parameters below as of 16 Mar 2023 13:32  Patient On (Oxygen Delivery Method): room air        LABS                        11.2   5.52  )-----------( 201      ( 16 Mar 2023 12:26 )             35.6               03-16    144  |  107  |  12  ----------------------------<  103<H>  3.7   |  30  |  0.57    Ca    8.8      16 Mar 2023 12:26    TPro  6.8  /  Alb  3.1<L>  /  TBili  0.4  /  DBili  x   /  AST  17  /  ALT  15  /  AlkPhos  92  03-15      ROS  REVIEW OF SYSTEMS: pertinent information in Physical exam below    PHYSICAL EXAM  LE Focused:  bilateral exam  Vasc:  DP and PT pulses faintly palpable. TG: warm to cool. Multiple varicosities perimalleolar. No edema, no erythema   Derm: Right distal danielle-medial leg - fibrotic wound measuring about 3.5cmx1.6cmx0.1cm. No periwound erythema. No purulence/drainage/fluctuance  Left foot 5th digit - superficial lesion with a fibro-granular base measuring about [id number]. No periwound erythema/purulence/drainage/fluctuance  Neuro: protective sensation decreased  MSK: deferred at this time    IMAGING: pending      A:  Right distal leg superficial wound  Left foot 5th digit wound    P:   Patient evaluated and Chart reviewed   Discussed diagnosis and treatment with patient  Applied betadine with dry sterile dressing  Ordered Santyl, recommend Santyl application every other day  Ordered xrays  Continue with IV antibiotics As Per ID  Ordered MYESHA/PVR  Podiatry to follow while in house  Seen bedside and discussed with Attending Dr. Montalvo

## 2023-03-16 NOTE — H&P ADULT - NSHPREVIEWOFSYSTEMS_GEN_ALL_CORE
CONSTITUTIONAL: No fever, weight loss, or fatigue  RESPIRATORY: No cough, wheezing, chills or hemoptysis; No shortness of breath  CARDIOVASCULAR: No chest pain, palpitations, dizziness, or leg swelling  GASTROINTESTINAL: No abdominal pain. No nausea, vomiting, or hematemesis; No diarrhea or constipation. No melena or hematochezia.  GENITOURINARY: No dysuria or hematuria, urinary frequency  NEUROLOGICAL: No headaches, memory loss, loss of strength, numbness, or tremors  ENDOCRINE: No polyuria, polydipsia, or heat/cold intolerance  MUSCULOSKELETAL: No muscle aches, joint pains  HEME: no easy bruisability, no tender or enlarged lymph nodes  SKIN: Ulcers and redness on b/l LE.

## 2023-03-16 NOTE — PATIENT PROFILE ADULT - FALL HARM RISK - HARM RISK INTERVENTIONS

## 2023-03-16 NOTE — H&P ADULT - HISTORY OF PRESENT ILLNESS
This is a 67 year old female, coming from [facility], with no significant medical history coming in for b/l LE ulcers. She states she has had chronic ulcers that have been getting worse. She was being treated with doxy in the assisted living but her cellulitis did not improve. She continues to have painful ulcers on the lower extremities She denies any headaches, visual disturbances, N/V/D, dizziness, falls, chest pain, palpitations, lower extremity swelling, fevers, skin rash, recent travel, or sick contacts

## 2023-03-16 NOTE — H&P ADULT - PROBLEM SELECTOR PLAN 1
p/w swelling and pain of b/l Lower extremity  Started on abx: Ancef  F/U blood cultures  Podiatry consulted  vascular to be consulted in the AM. p/w swelling and pain of b/l Lower extremity  Started on abx: Unsayn  F/U blood cultures  Podiatry consulted  f/u MYESHA  Dr. Matta consulted

## 2023-03-16 NOTE — H&P ADULT - CONVERSATION DETAILS
An extensive goals of care conversation was held including options, risks and benefits of many medical treatments including CPR, intubation, and tube feeding. As per your best interests, you have been made DNR/DNI. A MOLST has been completed to this effect.

## 2023-03-16 NOTE — CONSULT NOTE ADULT - SUBJECTIVE AND OBJECTIVE BOX
HPI:  This is a 67 year old female, coming from [facility], with no significant medical history coming in for b/l LE ulcers. She states she has had chronic ulcers that have been getting worse. She was being treated with doxy in the assisted living but her cellulitis did not improve. She continues to have painful ulcers on the lower extremities She denies any headaches, visual disturbances, N/V/D, dizziness, falls, chest pain, palpitations, lower extremity swelling, fevers, skin rash, recent travel, or sick contacts   (16 Mar 2023 01:39)      PAST MEDICAL & SURGICAL HISTORY:  GERD (gastroesophageal reflux disease)      H/O: depression      Osteoporosis      H/O personality disorder      No significant past surgical history          No Known Allergies      Meds:  acetaminophen     Tablet .. 650 milliGRAM(s) Oral every 6 hours PRN  aluminum hydroxide/magnesium hydroxide/simethicone Suspension 30 milliLiter(s) Oral every 4 hours PRN  ampicillin/sulbactam  IVPB 3 Gram(s) IV Intermittent every 6 hours  aspirin enteric coated 81 milliGRAM(s) Oral daily  cholecalciferol 1000 Unit(s) Oral daily  collagenase Ointment 1 Application(s) Topical daily  enoxaparin Injectable 40 milliGRAM(s) SubCutaneous every 24 hours  melatonin 3 milliGRAM(s) Oral at bedtime PRN  multivitamin 1 Tablet(s) Oral daily  ondansetron Injectable 4 milliGRAM(s) IV Push every 8 hours PRN  pantoprazole    Tablet 40 milliGRAM(s) Oral before breakfast      SOCIAL HISTORY:  Smoker:  YES / NO        PACK YEARS:                         WHEN QUIT?  ETOH use:  YES / NO               FREQUENCY / QUANTITY:  [place]lic[place] Drug use:  YES / NO  Occupation:  Assisted device use (Cane / Walker):  Live with:    FAMILY HISTORY:  No pertinent family history in first degree relatives        VITALS:  Vital Signs Last 24 Hrs  T(C): 36.8 (16 Mar 2023 13:32), Max: 37.1 (16 Mar 2023 03:55)  T(F): 98.2 (16 Mar 2023 13:32), Max: 98.7 (16 Mar 2023 03:55)  HR: 87 (16 Mar 2023 13:32) (68 - 114)  BP: 116/77 (16 Mar 2023 13:32) (116/77 - 138/75)  BP(mean): --  RR: 18 (16 Mar 2023 13:32) (16 - 18)  SpO2: 99% (16 Mar 2023 13:32) (98% - 100%)    Parameters below as of 16 Mar 2023 13:32  Patient On (Oxygen Delivery Method): room air        LABS/DIAGNOSTIC TESTS:                          11.2   5.52  )-----------( 201      ( 16 Mar 2023 12:26 )             35.6     WBC Count: 5.52 K/uL (03-16 @ 12:26)  WBC Count: 5.82 K/uL (03-15 @ 21:26)      03-16    144  |  107  |  12  ----------------------------<  103<H>  3.7   |  30  |  0.57    Ca    8.8      16 Mar 2023 12:26    TPro  6.8  /  Alb  3.1<L>  /  TBili  0.4  /  DBili  x   /  AST  17  /  ALT  15  /  AlkPhos  92  03-15          LIVER FUNCTIONS - ( 15 Mar 2023 21:26 )  Alb: 3.1 g/dL / Pro: 6.8 g/dL / ALK PHOS: 92 U/L / ALT: 15 U/L DA / AST: 17 U/L / GGT: x                 LACTATE:    ABG -     CULTURES:       RADIOLOGY:< from: [facility] Physiol Extremity Lower 3+ Level, BI (03.16.23 @ 11:24) >  ACC: [id number] EXAM:  US PHYSIOL LWR EXT 3+ LEV BI   ORDERED BY: DEVONTE ZAMAN     PROCEDURE DATE:  03/16/2023          INTERPRETATION:  Clinical information: Nonsmoker, nondiabetic, presents   with cellulitis and nonhealing ulcer left lower extremity.    COMPARISON: None available.    TECHNIQUE: Lower extremity arterial Doppler study.    FINDINGS: Ankle brachial index measures 1.26 on the right and 1.21 on the   left. No segmental arterial pressure gradient is detected.    PVR waveforms are normal in amplitude and pulsatility throughout both   lower extremities.    IMPRESSION: No Doppler evidence of hemodynamically significant arterial   flow limitation in either lower extremity.    --- End of Report ---            IRISH CONCEPCION MD; AttendingRadiologist  This document has been electronically signed. Mar 16 2023 11:41AM    < end of copied text >  ----------------------------------------------------------------------------------------------------------------------------------------------------------------------------------------------------------------------  ACC: [id number] EXAM:  XR TIB FIB AP LAT 2 VIEWS RT   ORDERED BY: QUETA FRIEDMAN     PROCEDURE DATE:  03/03/2023          INTERPRETATION:  Radiographs of the RIGHT tibia and fibula    CLINICAL INFORMATION: RIGHT lower extremity soft tissue ulceration..    TECHNIQUE:  Frontal and lateral views of the tibia and fibula were   obtained.    FINDINGS:  No prior studies are available for review.    The tibia and fibula are intact.  No fracture or gross osseous lytic/ blastic lesion..  No subcutaneous air or radiopaque foreign body. No radiographic evidence   of osteomyelitis..    IMPRESSION:   No acute radiographic osseous pathology..    --- End of Report ---            ANGELA BEDOLLA MD; Attending Radiologist  This document has beenelectronically signed. Mar  3 2023  4:45PM    < end of copied text >        ROS  [  ] UNABLE TO ELICIT               HPI:  This is a 67 year old female, coming from [facility], with no significant medical history coming in for b/l LE ulcers. She states she has had chronic ulcers that have been getting worse. She was being treated with doxy in the assisted living but her cellulitis did not improve. She continues to have painful ulcers on the lower extremities She denies any headaches, visual disturbances, N/V/D, dizziness, falls, chest pain, palpitations, lower extremity swelling, fevers, skin rash, recent travel, or sick contacts   (16 Mar 2023 01:39)      History as above, asked to see this patient who is from an assisted living facility and has varicose veins and stasis dermatitis, she has right lower leg pain and swelling and discoloration , no fevers , chills or other complaints, she denies any trauma to the leg.        PAST MEDICAL & SURGICAL HISTORY:  GERD (gastroesophageal reflux disease)      H/O: depression      Osteoporosis      H/O personality disorder      No significant past surgical history          No Known Allergies      Meds:  acetaminophen     Tablet .. 650 milliGRAM(s) Oral every 6 hours PRN  aluminum hydroxide/magnesium hydroxide/simethicone Suspension 30 milliLiter(s) Oral every 4 hours PRN  ampicillin/sulbactam  IVPB 3 Gram(s) IV Intermittent every 6 hours  aspirin enteric coated 81 milliGRAM(s) Oral daily  cholecalciferol 1000 Unit(s) Oral daily  collagenase Ointment 1 Application(s) Topical daily  enoxaparin Injectable 40 milliGRAM(s) SubCutaneous every 24 hours  melatonin 3 milliGRAM(s) Oral at bedtime PRN  multivitamin 1 Tablet(s) Oral daily  ondansetron Injectable 4 milliGRAM(s) IV Push every 8 hours PRN  pantoprazole    Tablet 40 milliGRAM(s) Oral before breakfast      SOCIAL HISTORY:  Smoker: no  ETOH use:  no      FAMILY HISTORY:  No pertinent family history in first degree relatives        VITALS:  Vital Signs Last 24 Hrs  T(C): 36.8 (16 Mar 2023 13:32), Max: 37.1 (16 Mar 2023 03:55)  T(F): 98.2 (16 Mar 2023 13:32), Max: 98.7 (16 Mar 2023 03:55)  HR: 87 (16 Mar 2023 13:32) (68 - 114)  BP: 116/77 (16 Mar 2023 13:32) (116/77 - 138/75)  BP(mean): --  RR: 18 (16 Mar 2023 13:32) (16 - 18)  SpO2: 99% (16 Mar 2023 13:32) (98% - 100%)    Parameters below as of 16 Mar 2023 13:32  Patient On (Oxygen Delivery Method): room air        LABS/DIAGNOSTIC TESTS:                          11.2   5.52  )-----------( 201      ( 16 Mar 2023 12:26 )             35.6     WBC Count: 5.52 K/uL (03-16 @ 12:26)  WBC Count: 5.82 K/uL (03-15 @ 21:26)      03-16    144  |  107  |  12  ----------------------------<  103<H>  3.7   |  30  |  0.57    Ca    8.8      16 Mar 2023 12:26    TPro  6.8  /  Alb  3.1<L>  /  TBili  0.4  /  DBili  x   /  AST  17  /  ALT  15  /  AlkPhos  92  03-15          LIVER FUNCTIONS - ( 15 Mar 2023 21:26 )  Alb: 3.1 g/dL / Pro: 6.8 g/dL / ALK PHOS: 92 U/L / ALT: 15 U/L DA / AST: 17 U/L / GGT: x                 LACTATE:    ABG -     CULTURES:       RADIOLOGY:< from: [facility] Physiol Extremity Lower 3+ Level, BI (03.16.23 @ 11:24) >  ACC: [id number] EXAM:  US PHYSIOL LWR EXT 3+ LEV BI   ORDERED BY: DEVONTE ZAMAN     PROCEDURE DATE:  03/16/2023          INTERPRETATION:  Clinical information: Nonsmoker, nondiabetic, presents   with cellulitis and nonhealing ulcer left lower extremity.    COMPARISON: None available.    TECHNIQUE: Lower extremity arterial Doppler study.    FINDINGS: Ankle brachial index measures 1.26 on the right and 1.21 on the   left. No segmental arterial pressure gradient is detected.    PVR waveforms are normal in amplitude and pulsatility throughout both   lower extremities.    IMPRESSION: No Doppler evidence of hemodynamically significant arterial   flow limitation in either lower extremity.    --- End of Report ---            IRISH CONCEPCION MD; AttendingRadiologist  This document has been electronically signed. Mar 16 2023 11:41AM    < end of copied text >  ----------------------------------------------------------------------------------------------------------------------------------------------------------------------------------------------------------------------  ACC: [id number] EXAM:  XR TIB FIB AP LAT 2 VIEWS RT   ORDERED BY: QUETA FRIEDMAN     PROCEDURE DATE:  03/03/2023          INTERPRETATION:  Radiographs of the RIGHT tibia and fibula    CLINICAL INFORMATION: RIGHT lower extremity soft tissue ulceration..    TECHNIQUE:  Frontal and lateral views of the tibia and fibula were   obtained.    FINDINGS:  No prior studies are available for review.    The tibia and fibula are intact.  No fracture or gross osseous lytic/ blastic lesion..  No subcutaneous air or radiopaque foreign body. No radiographic evidence   of osteomyelitis..    IMPRESSION:   No acute radiographic osseous pathology..    --- End of Report ---            ANGELA BEDOLLA MD; Attending Radiologist  This document has beenelectronically signed. Mar  3 2023  4:45PM    < end of copied text >        ROS  [  ] UNABLE TO ELICIT

## 2023-03-16 NOTE — H&P ADULT - NSHPPHYSICALEXAM_GEN_ALL_CORE
Vital Signs Last 24 Hrs  T(C): 36.4 (15 Mar 2023 23:49), Max: 37.2 (15 Mar 2023 16:48)  T(F): 97.5 (15 Mar 2023 23:49), Max: 98.9 (15 Mar 2023 16:48)  HR: 68 (15 Mar 2023 23:49) (68 - 87)  BP: 138/75 (15 Mar 2023 23:49) (124/81 - 138/75)  BP(mean): --  RR: 18 (15 Mar 2023 23:49) (16 - 18)  SpO2: 100% (15 Mar 2023 23:49) (100% - 100%)    Parameters below as of 15 Mar 2023 23:49  Patient On (Oxygen Delivery Method): room air    PHYSICAL EXAM:  GENERAL: NAD, speaks in full sentences, no signs of respiratory distress  HEAD:  Atraumatic, Normocephalic  EYES: EOMI, PERRLA, conjunctiva and sclera clear  NECK: Supple, No JVD  CHEST/LUNG: Clear to auscultation bilaterally; No wheeze; No crackles; No accessory muscles used  HEART: Regular rate and rhythm; No murmurs;   ABDOMEN: Soft, Nontender, Nondistended; Bowel sounds present; No guarding  EXTREMITIES:  2+ Peripheral Pulses, No cyanosis or edema  PSYCH: AAOx3  NEUROLOGY: non-focal  SKIN: b/l LE swelling and redness.

## 2023-03-16 NOTE — H&P ADULT - ASSESSMENT
This is a 67 year old female, coming from [facility], with no significant medical history being admitted for b/l LE cellulitis

## 2023-03-16 NOTE — PATIENT PROFILE ADULT - FUNCTIONAL ASSESSMENT - BASIC MOBILITY 6.
3-calculated by average/Not able to assess (calculate score using [facility] averaging method)

## 2023-03-17 LAB
ANION GAP SERPL CALC-SCNC: 8 MMOL/L — SIGNIFICANT CHANGE UP (ref 5–17)
BUN SERPL-MCNC: 15 MG/DL — SIGNIFICANT CHANGE UP (ref 7–18)
CALCIUM SERPL-MCNC: 9 MG/DL — SIGNIFICANT CHANGE UP (ref 8.4–10.5)
CHLORIDE SERPL-SCNC: 111 MMOL/L — HIGH (ref 96–108)
CO2 SERPL-SCNC: 29 MMOL/L — SIGNIFICANT CHANGE UP (ref 22–31)
CREAT SERPL-MCNC: 0.63 MG/DL — SIGNIFICANT CHANGE UP (ref 0.5–1.3)
EGFR: 96 ML/MIN/1.73M2 — SIGNIFICANT CHANGE UP
GLUCOSE SERPL-MCNC: 94 MG/DL — SIGNIFICANT CHANGE UP (ref 70–99)
HCT VFR BLD CALC: 37.4 % — SIGNIFICANT CHANGE UP (ref 34.5–45)
HCV AB S/CO SERPL IA: 0.16 S/CO — SIGNIFICANT CHANGE UP (ref 0–0.99)
HCV AB SERPL-IMP: SIGNIFICANT CHANGE UP
HGB BLD-MCNC: 11.6 G/DL — SIGNIFICANT CHANGE UP (ref 11.5–15.5)
MCHC RBC-ENTMCNC: 29.7 PG — SIGNIFICANT CHANGE UP (ref 27–34)
MCHC RBC-ENTMCNC: 31 GM/DL — LOW (ref 32–36)
MCV RBC AUTO: 95.7 FL — SIGNIFICANT CHANGE UP (ref 80–100)
NRBC # BLD: 0 /100 WBCS — SIGNIFICANT CHANGE UP (ref 0–0)
PLATELET # BLD AUTO: 201 K/UL — SIGNIFICANT CHANGE UP (ref 150–400)
POTASSIUM SERPL-MCNC: 3.9 MMOL/L — SIGNIFICANT CHANGE UP (ref 3.5–5.3)
POTASSIUM SERPL-SCNC: 3.9 MMOL/L — SIGNIFICANT CHANGE UP (ref 3.5–5.3)
RBC # BLD: 3.91 M/UL — SIGNIFICANT CHANGE UP (ref 3.8–5.2)
RBC # FLD: 12.8 % — SIGNIFICANT CHANGE UP (ref 10.3–14.5)
SODIUM SERPL-SCNC: 148 MMOL/L — HIGH (ref 135–145)
WBC # BLD: 4.67 K/UL — SIGNIFICANT CHANGE UP (ref 3.8–10.5)
WBC # FLD AUTO: 4.67 K/UL — SIGNIFICANT CHANGE UP (ref 3.8–10.5)

## 2023-03-17 RX ADMIN — ENOXAPARIN SODIUM 40 MILLIGRAM(S): 100 INJECTION SUBCUTANEOUS at 05:23

## 2023-03-17 RX ADMIN — Medication 1 APPLICATION(S): at 11:45

## 2023-03-17 RX ADMIN — AMPICILLIN SODIUM AND SULBACTAM SODIUM 200 GRAM(S): 250; 125 INJECTION, POWDER, FOR SUSPENSION INTRAMUSCULAR; INTRAVENOUS at 17:45

## 2023-03-17 RX ADMIN — Medication 81 MILLIGRAM(S): at 11:44

## 2023-03-17 RX ADMIN — AMPICILLIN SODIUM AND SULBACTAM SODIUM 200 GRAM(S): 250; 125 INJECTION, POWDER, FOR SUSPENSION INTRAMUSCULAR; INTRAVENOUS at 05:24

## 2023-03-17 RX ADMIN — AMPICILLIN SODIUM AND SULBACTAM SODIUM 200 GRAM(S): 250; 125 INJECTION, POWDER, FOR SUSPENSION INTRAMUSCULAR; INTRAVENOUS at 11:44

## 2023-03-17 RX ADMIN — PANTOPRAZOLE SODIUM 40 MILLIGRAM(S): 20 TABLET, DELAYED RELEASE ORAL at 05:23

## 2023-03-17 RX ADMIN — Medication 1 TABLET(S): at 11:45

## 2023-03-17 RX ADMIN — Medication 1000 UNIT(S): at 11:45

## 2023-03-17 NOTE — PROGRESS NOTE ADULT - SUBJECTIVE AND OBJECTIVE BOX
NP Note discussed with  primary attending    Patient is a 67y old  Female who presents with a chief complaint of Cellulitis (17 Mar 2023 14:02)      INTERVAL HPI/OVERNIGHT EVENTS: Patient seen at bedside , afebrile, no sob. Pt with LE cellulitis and ulcers. Podiatry following   Xray of left foot pending results. ID following     MEDICATIONS  (STANDING):  ampicillin/sulbactam  IVPB 3 Gram(s) IV Intermittent every 6 hours  aspirin enteric coated 81 milliGRAM(s) Oral daily  cholecalciferol 1000 Unit(s) Oral daily  collagenase Ointment 1 Application(s) Topical daily  enoxaparin Injectable 40 milliGRAM(s) SubCutaneous every 24 hours  multivitamin 1 Tablet(s) Oral daily  pantoprazole    Tablet 40 milliGRAM(s) Oral before breakfast    MEDICATIONS  (PRN):  acetaminophen     Tablet .. 650 milliGRAM(s) Oral every 6 hours PRN Temp greater or equal to 38C (100.4F), Mild Pain (1 - 3)  aluminum hydroxide/magnesium hydroxide/simethicone Suspension 30 milliLiter(s) Oral every 4 hours PRN Dyspepsia  melatonin 3 milliGRAM(s) Oral at bedtime PRN Insomnia  ondansetron Injectable 4 milliGRAM(s) IV Push every 8 hours PRN Nausea and/or Vomiting      __________________________________________________  REVIEW OF SYSTEMS:    CONSTITUTIONAL: No fever,   EYES: no acute visual disturbances  NECK: No pain or stiffness  RESPIRATORY: No cough; No shortness of breath  CARDIOVASCULAR: No chest pain, no palpitations  GASTROINTESTINAL: No pain. No nausea or vomiting; No diarrhea   NEUROLOGICAL: No headache or numbness, no tremors  MUSCULOSKELETAL: No joint pain, no muscle pain, LE cellulitis, no pain  GENITOURINARY: no dysuria, no frequency, no hesitancy  PSYCHIATRY: no depression , no anxiety  ALL OTHER  ROS negative        Vital Signs Last 24 Hrs  T(C): 36.7 (17 Mar 2023 13:47), Max: 37.2 (17 Mar 2023 04:58)  T(F): 98.1 (17 Mar 2023 13:47), Max: 99 (17 Mar 2023 04:58)  HR: 84 (17 Mar 2023 13:47) (78 - 107)  BP: 146/68 (17 Mar 2023 13:47) (106/49 - 146/68)  BP(mean): --  RR: 18 (17 Mar 2023 13:47) (16 - 18)  SpO2: 98% (17 Mar 2023 13:47) (97% - 99%)    Parameters below as of 17 Mar 2023 13:47  Patient On (Oxygen Delivery Method): room air        ________________________________________________  PHYSICAL EXAM:  GENERAL: NAD  HEENT: Normocephalic;  conjunctivae and sclerae clear; moist mucous membranes;   NECK : supple  CHEST/LUNG: Clear to ausculitation bilaterally with good air entry   HEART: S1 S2  regular; no murmurs, gallops or rubs  ABDOMEN: Soft, Nontender, Nondistended; Bowel sounds present  EXTREMITIES: no cyanosis; no edema; no calf tenderness, diabetic ulcers, spinal kyphosis   SKIN: warm and dry; no rash  NERVOUS SYSTEM:  Awake and alert; Oriented  to place, person and time ; no new deficits    _________________________________________________  LABS:                        11.6   4.67  )-----------( 201      ( 17 Mar 2023 07:50 )             37.4     03-17    148<H>  |  111<H>  |  15  ----------------------------<  94  3.9   |  29  |  0.63    Ca    9.0      17 Mar 2023 07:50    TPro  6.8  /  Alb  3.1<L>  /  TBili  0.4  /  DBili  x   /  AST  17  /  ALT  15  /  AlkPhos  92  03-15        CAPILLARY BLOOD GLUCOSE            RADIOLOGY & ADDITIONAL TESTS:    < from: [facility] Physiol Extremity Lower 3+ Level, BI (03.16.23 @ 11:24) >  ACC: [id number] EXAM:  US PHYSIOL LWR EXT 3+ LEV BI   ORDERED BY: DEVONTE ZAMAN     PROCEDURE DATE:  03/16/2023          INTERPRETATION:  Clinical information: Nonsmoker, nondiabetic, presents   with cellulitis and nonhealing ulcer left lower extremity.    COMPARISON: None available.    TECHNIQUE: Lower extremity arterial Doppler study.    FINDINGS: Ankle brachial index measures 1.26 on the right and 1.21 on the   left. No segmental arterial pressure gradient is detected.    PVR waveforms are normal in amplitude and pulsatility throughout both   lower extremities.    IMPRESSION: No Doppler evidence of hemodynamically significant arterial   flow limitation in either lower extremity.    --- End of Report ---            IRISH CONCEPCION MD; AttendingRadiologist  This document has been electronically signed. Mar 16 2023 11:41AM    < end of copied text >  < from: Xray Tibia + Fibula 2 Views, Right (03.03.23 @ 16:36) >  ACC: [id number] EXAM:  XR TIB FIB AP LAT 2 VIEWS RT   ORDERED BY: QUETA FRIEDMAN     PROCEDURE DATE:  03/03/2023          INTERPRETATION:  Radiographs of the RIGHT tibia and fibula    CLINICAL INFORMATION: RIGHT lower extremity soft tissue ulceration..    TECHNIQUE:  Frontal and lateral views of the tibia and fibula were   obtained.    FINDINGS:  No prior studies are available for review.    The tibia and fibula are intact.  No fracture or gross osseous lytic/ blastic lesion..  No subcutaneous air or radiopaque foreign body. No radiographic evidence   of osteomyelitis..    IMPRESSION:   No acute radiographic osseous pathology..    --- End of Report ---            ANGELA BEDOLLA MD; Attending Radiologist  This document has beenelectronically signed. Mar  3 2023  4:45PM    < end of copied text >  < from: CT Lumbar Spine No Cont (07.13.22 @ 00:55) >    ACC: [id number] EXAM:  CT LUMBAR SPINE                          PROCEDURE DATE:  07/13/2022          INTERPRETATION:  Lumbar spine CT    Indication: Fall with right mid to lower back pain    Technique: Axial images were obtained, along with reformatted coronal and   sagittal images. 3-D volume rendered images are submitted.    Comparison: None    Findings:    The bones are diffusely osteopenic. There are displaced fractures of the   right transverse processes of L2 and L3. Vertebral heights and   intervertebral disc spaces are maintained. There is maintenance of the   lumbar lordosis. Mild disc protrusions between L4/L5 and L5/S1. No   significant bony canal or neuroforaminal stenosis. There are no   suspicious focal lytic or sclerotic bone lesions. There are no paraspinal   collections.    There is a 2 mm nonobstructive right renal stone.    IMPRESSION:    Acute fractures of the right transverse processes of L2 and L3.    --- End of Report ---    < end of copied text >    Imaging Personally Reviewed:  YES    Consultant(s) Notes Reviewed:   YES    Care Discussed with Consultants :     Plan of care was discussed with patient and /or primary care giver; all questions and concerns were addressed and care was aligned with patient's wishes.

## 2023-03-17 NOTE — PROGRESS NOTE ADULT - SUBJECTIVE AND OBJECTIVE BOX
Patient is a 67y old  Female who presents with a chief complaint of Cellulitis (16 Mar 2023 20:03)    PATIENT IS SEEN AND EXAMINED IN MEDICAL FLOOR.  NGT [    ]    KEVIN [   ]      GT [   ]    ALLERGIES:  No Known Allergies      Daily     Daily     VITALS:    Vital Signs Last 24 Hrs  T(C): 37.2 (17 Mar 2023 04:58), Max: 37.2 (17 Mar 2023 04:58)  T(F): 99 (17 Mar 2023 04:58), Max: 99 (17 Mar 2023 04:58)  HR: 107 (17 Mar 2023 08:52) (78 - 107)  BP: 126/59 (17 Mar 2023 08:52) (106/49 - 141/65)  BP(mean): --  RR: 16 (17 Mar 2023 04:58) (16 - 18)  SpO2: 99% (17 Mar 2023 08:52) (97% - 99%)    Parameters below as of 17 Mar 2023 04:58  Patient On (Oxygen Delivery Method): room air        LABS:    CBC Full  -  ( 17 Mar 2023 07:50 )  WBC Count : 4.67 K/uL  RBC Count : 3.91 M/uL  Hemoglobin : 11.6 g/dL  Hematocrit : 37.4 %  Platelet Count - Automated : 201 K/uL  Mean Cell Volume : 95.7 fl  Mean Cell Hemoglobin : 29.7 pg  Mean Cell Hemoglobin Concentration : 31.0 gm/dL  Auto Neutrophil # : x  Auto Lymphocyte # : x  Auto Monocyte # : x  Auto Eosinophil # : x  Auto Basophil # : x  Auto Neutrophil % : x  Auto Lymphocyte % : x  Auto Monocyte % : x  Auto Eosinophil % : x  Auto Basophil % : x      03-16    144  |  107  |  12  ----------------------------<  103<H>  3.7   |  30  |  0.57    Ca    8.8      16 Mar 2023 12:26    TPro  6.8  /  Alb  3.1<L>  /  TBili  0.4  /  DBili  x   /  AST  17  /  ALT  15  /  AlkPhos  92  03-15    CAPILLARY BLOOD GLUCOSE            LIVER FUNCTIONS - ( 15 Mar 2023 21:26 )  Alb: 3.1 g/dL / Pro: 6.8 g/dL / ALK PHOS: 92 U/L / ALT: 15 U/L DA / AST: 17 U/L / GGT: x           Creatinine Trend: 0.57<--, 0.63<--, 0.64<--  I&O's Summary              MEDICATIONS:    MEDICATIONS  (STANDING):  ampicillin/sulbactam  IVPB 3 Gram(s) IV Intermittent every 6 hours  aspirin enteric coated 81 milliGRAM(s) Oral daily  cholecalciferol 1000 Unit(s) Oral daily  collagenase Ointment 1 Application(s) Topical daily  enoxaparin Injectable 40 milliGRAM(s) SubCutaneous every 24 hours  multivitamin 1 Tablet(s) Oral daily  pantoprazole    Tablet 40 milliGRAM(s) Oral before breakfast      MEDICATIONS  (PRN):  acetaminophen     Tablet .. 650 milliGRAM(s) Oral every 6 hours PRN Temp greater or equal to 38C (100.4F), Mild Pain (1 - 3)  aluminum hydroxide/magnesium hydroxide/simethicone Suspension 30 milliLiter(s) Oral every 4 hours PRN Dyspepsia  melatonin 3 milliGRAM(s) Oral at bedtime PRN Insomnia  ondansetron Injectable 4 milliGRAM(s) IV Push every 8 hours PRN Nausea and/or Vomiting      REVIEW OF SYSTEMS:                           ALL ROS DONE [ X   ]    CONSTITUTIONAL:  LETHARGIC [   ], FEVER [   ], UNRESPONSIVE [   ]  CVS:  CP  [   ], SOB, [   ], PALPITATIONS [   ], DIZZYNESS [   ]  RS: COUGH [   ], SPUTUM [   ]  GI: ABDOMINAL PAIN [   ], NAUSEA [   ], VOMITINGS [   ], DIARRHEA [   ], CONSTIPATION [   ]  :  DYSURIA [   ], NOCTURIA [   ], INCREASED FREQUENCY [   ], DRIBLING [   ],  SKELETAL: PAINFUL JOINTS [   ], SWOLLEN JOINTS [   ], NECK ACHE [   ], LOW BACK ACHE [   ],  SKIN : ULCERS [   ], RASH [   ], ITCHING [   ]  CNS: HEAD ACHE [   ], DOUBLE VISION [   ], BLURRED VISION [   ], AMS / CONFUSION [   ], SEIZURES [   ], WEAKNESS [   ],TINGLING / NUMBNESS [   ]    PHYSICAL EXAMINATION:  GENERAL APPEARANCE: NO DISTRESS  HEENT:  NO PALLOR, NO  JVD,  NO   NODES, NECK SUPPLE  CVS: S1 +, S2 +,   RS: AEEB,  OCCASIONAL  RALES +,   NO RONCHI  ABD: SOFT, NT, NO, BS +  EXT: NO PE  SKIN: WARM,   SKELETAL:  ROM ACCEPTABLE  CNS:  AAO X    ,   DEFICITS    RADIOLOGY :      ASSESSMENT :     Cellulitis    Yes    GERD (gastroesophageal reflux disease)    H/O: depression    Osteoporosis    H/O personality disorder    No significant past surgical history        PLAN:  HPI:  This is a 67 year old female, coming from [facility], with no significant medical history coming in for b/l LE ulcers. She states she has had chronic ulcers that have been getting worse. She was being treated with doxy in the assisted living but her cellulitis did not improve. She continues to have painful ulcers on the lower extremities She denies any headaches, visual disturbances, N/V/D, dizziness, falls, chest pain, palpitations, lower extremity swelling, fevers, skin rash, recent travel, or sick contacts   (16 Mar 2023 01:39)    # RIGHT SHIN ULCER + LEFT FOOT WOUND W/ SUPERIMPOSE CELLULITIS, S/P OUTPATIENT ANTIBIOTICS COURSE  - UNASYN, F/U BCX  - NOTED MYESHA/PVR - W/OUT PAD  - F/U XRAY  - PODIATRY CONSULT IN PROGRESS  - ID CONSULT IN PROGRESS    # F/U PT EVAL    # SEVERE PROTEIN CALORIE MALNUTRITION  - NUTRITIONAL SUPPLEMENT    # GI AND DVT PPX   Patient is a 67y old  Female who presents with a chief complaint of Cellulitis (16 Mar 2023 20:03)    PATIENT IS SEEN AND EXAMINED IN MEDICAL FLOOR. PATIENT REPORTS INTERMITTENT LEFT FOOT PAIN.    ALLERGIES:  No Known Allergies    VITALS:    Vital Signs Last 24 Hrs  T(C): 37.2 (17 Mar 2023 04:58), Max: 37.2 (17 Mar 2023 04:58)  T(F): 99 (17 Mar 2023 04:58), Max: 99 (17 Mar 2023 04:58)  HR: 107 (17 Mar 2023 08:52) (78 - 107)  BP: 126/59 (17 Mar 2023 08:52) (106/49 - 141/65)  BP(mean): --  RR: 16 (17 Mar 2023 04:58) (16 - 18)  SpO2: 99% (17 Mar 2023 08:52) (97% - 99%)    Parameters below as of 17 Mar 2023 04:58  Patient On (Oxygen Delivery Method): room air        LABS:    CBC Full  -  ( 17 Mar 2023 07:50 )  WBC Count : 4.67 K/uL  RBC Count : 3.91 M/uL  Hemoglobin : 11.6 g/dL  Hematocrit : 37.4 %  Platelet Count - Automated : 201 K/uL  Mean Cell Volume : 95.7 fl  Mean Cell Hemoglobin : 29.7 pg  Mean Cell Hemoglobin Concentration : 31.0 gm/dL  Auto Neutrophil # : x  Auto Lymphocyte # : x  Auto Monocyte # : x  Auto Eosinophil # : x  Auto Basophil # : x  Auto Neutrophil % : x  Auto Lymphocyte % : x  Auto Monocyte % : x  Auto Eosinophil % : x  Auto Basophil % : x      03-16    144  |  107  |  12  ----------------------------<  103<H>  3.7   |  30  |  0.57    Ca    8.8      16 Mar 2023 12:26    TPro  6.8  /  Alb  3.1<L>  /  TBili  0.4  /  DBili  x   /  AST  17  /  ALT  15  /  AlkPhos  92  03-15    CAPILLARY BLOOD GLUCOSE            LIVER FUNCTIONS - ( 15 Mar 2023 21:26 )  Alb: 3.1 g/dL / Pro: 6.8 g/dL / ALK PHOS: 92 U/L / ALT: 15 U/L DA / AST: 17 U/L / GGT: x           Creatinine Trend: 0.57<--, 0.63<--, 0.64<--  I&O's Summary              MEDICATIONS:    MEDICATIONS  (STANDING):  ampicillin/sulbactam  IVPB 3 Gram(s) IV Intermittent every 6 hours  aspirin enteric coated 81 milliGRAM(s) Oral daily  cholecalciferol 1000 Unit(s) Oral daily  collagenase Ointment 1 Application(s) Topical daily  enoxaparin Injectable 40 milliGRAM(s) SubCutaneous every 24 hours  multivitamin 1 Tablet(s) Oral daily  pantoprazole    Tablet 40 milliGRAM(s) Oral before breakfast      MEDICATIONS  (PRN):  acetaminophen     Tablet .. 650 milliGRAM(s) Oral every 6 hours PRN Temp greater or equal to 38C (100.4F), Mild Pain (1 - 3)  aluminum hydroxide/magnesium hydroxide/simethicone Suspension 30 milliLiter(s) Oral every 4 hours PRN Dyspepsia  melatonin 3 milliGRAM(s) Oral at bedtime PRN Insomnia  ondansetron Injectable 4 milliGRAM(s) IV Push every 8 hours PRN Nausea and/or Vomiting      REVIEW OF SYSTEMS:                           ALL ROS DONE [ X   ]    CONSTITUTIONAL:  LETHARGIC [   ], FEVER [   ], UNRESPONSIVE [   ]  CVS:  CP  [   ], SOB, [   ], PALPITATIONS [   ], DIZZYNESS [   ]  RS: COUGH [   ], SPUTUM [   ]  GI: ABDOMINAL PAIN [   ], NAUSEA [   ], VOMITINGS [   ], DIARRHEA [   ], CONSTIPATION [   ]  :  DYSURIA [   ], NOCTURIA [   ], INCREASED FREQUENCY [   ], DRIBLING [   ],  SKELETAL: PAINFUL JOINTS [   ], SWOLLEN JOINTS [   ], NECK ACHE [   ], LOW BACK ACHE [   ],  SKIN : ULCERS [   ], RASH [   ], ITCHING [   ]  CNS: HEAD ACHE [   ], DOUBLE VISION [   ], BLURRED VISION [   ], AMS / CONFUSION [   ], SEIZURES [   ], WEAKNESS [   ],TINGLING / NUMBNESS [   ]    PHYSICAL EXAMINATION:  GENERAL APPEARANCE: NO DISTRESS  HEENT:  NO PALLOR, NO  JVD,  NO   NODES, NECK SUPPLE  CVS: S1 +, S2 +,   RS: AEEB,  OCCASIONAL  RALES +,   NO RONCHI  ABD: SOFT, NT, NO, BS +  EXT: NO PE  SKIN: WARM,  RIGHT CALF WRAPPED, LEFT FOOT WRAPPED  SKELETAL:  ROM ACCEPTABLE  CNS:  AAO X  3    RADIOLOGY :    RADIOLOGY AND READINGS REVIEWED    ASSESSMENT :     Cellulitis    Yes    GERD (gastroesophageal reflux disease)    H/O: depression    Osteoporosis    H/O personality disorder    No significant past surgical history        PLAN:  HPI:  This is a 67 year old female, coming from [facility], with no significant medical history coming in for b/l LE ulcers. She states she has had chronic ulcers that have been getting worse. She was being treated with doxy in the assisted living but her cellulitis did not improve. She continues to have painful ulcers on the lower extremities She denies any headaches, visual disturbances, N/V/D, dizziness, falls, chest pain, palpitations, lower extremity swelling, fevers, skin rash, recent travel, or sick contacts   (16 Mar 2023 01:39)    # RIGHT SHIN ULCER + LEFT FOOT WOUND W/ SUPERIMPOSE CELLULITIS, S/P OUTPATIENT ANTIBIOTICS COURSE  - UNASYN, F/U BCX  - NOTED MYESHA/PVR - W/OUT PAD  - F/U XRAY  - PODIATRY CONSULT IN PROGRESS  - ID CONSULT IN PROGRESS    # F/U PT EVAL    # SEVERE PROTEIN CALORIE MALNUTRITION  - NUTRITIONAL SUPPLEMENT    # GI AND DVT PPX

## 2023-03-17 NOTE — PROGRESS NOTE ADULT - SUBJECTIVE AND OBJECTIVE BOX
Podiatry Interval: patient is seen sitting up eating lunch. Reports dressing to her left foot is uncomfortable. Overall feels better. No acute overnight events.    Podiatry HPI: Patient is a 67y old  Female who presents with a chief complaint of Cellulitis (16 Mar 2023 01:39). Patient reports no PMH except Anemia. She was referred to the ED because of a superficial wound to her right anteromedial distal leg. She is not sure how long the wound has been there. Patient also has a superficial wound to the 5th digit at her left foot. She admits no pain. No constitutional symptoms. No other pedal concerns. Patient does not like to have dressings on her feet or legs but agrees to a dressing application today.      HPI:  This is a 67 year old female, coming from [facility], with no significant medical history coming in for b/l LE ulcers. She states she has had chronic ulcers that have been getting worse. She was being treated with doxy in the assisted living but her cellulitis did not improve. She continues to have painful ulcers on the lower extremities She denies any headaches, visual disturbances, N/V/D, dizziness, falls, chest pain, palpitations, lower extremity swelling, fevers, skin rash, recent travel, or sick contacts   (16 Mar 2023 01:39)      Patient admits to  (-) Fevers, (-) Chills, (-) Nausea, (-) Vomiting, (-) Shortness of Breath (-) calf pain (-) chest pain     Medications acetaminophen     Tablet .. 650 milliGRAM(s) Oral every 6 hours PRN  aluminum hydroxide/magnesium hydroxide/simethicone Suspension 30 milliLiter(s) Oral every 4 hours PRN  ampicillin/sulbactam  IVPB 3 Gram(s) IV Intermittent every 6 hours  aspirin enteric coated 81 milliGRAM(s) Oral daily  cholecalciferol 1000 Unit(s) Oral daily  collagenase Ointment 1 Application(s) Topical daily  enoxaparin Injectable 40 milliGRAM(s) SubCutaneous every 24 hours  melatonin 3 milliGRAM(s) Oral at bedtime PRN  multivitamin 1 Tablet(s) Oral daily  ondansetron Injectable 4 milliGRAM(s) IV Push every 8 hours PRN  pantoprazole    Tablet 40 milliGRAM(s) Oral before breakfast    FHNo pertinent family history in first degree relatives    ,   PMHGERD (gastroesophageal reflux disease)    H/O: depression    Osteoporosis    H/O personality disorder       PSHNo significant past surgical history        Labs                          11.6   4.67  )-----------( 201      ( 17 Mar 2023 07:50 )             37.4      03-17    148<H>  |  111<H>  |  15  ----------------------------<  94  3.9   |  29  |  0.63    Ca    9.0      17 Mar 2023 07:50    TPro  6.8  /  Alb  3.1<L>  /  TBili  0.4  /  DBili  x   /  AST  17  /  ALT  15  /  AlkPhos  92  03-15     Vital Signs Last 24 Hrs  T(C): 37.2 (17 Mar 2023 04:58), Max: 37.2 (17 Mar 2023 04:58)  T(F): 99 (17 Mar 2023 04:58), Max: 99 (17 Mar 2023 04:58)  HR: 107 (17 Mar 2023 08:52) (78 - 107)  BP: 126/59 (17 Mar 2023 08:52) (106/49 - 141/65)  BP(mean): --  RR: 16 (17 Mar 2023 04:58) (16 - 16)  SpO2: 99% (17 Mar 2023 08:52) (97% - 99%)    Parameters below as of 17 Mar 2023 04:58  Patient On (Oxygen Delivery Method): room air              WBC Count: 4.67 K/uL (03-17-23 @ 07:50)      ROS  REVIEW OF SYSTEMS: pertinent information in Physical exam below    PHYSICAL EXAM  LE Focused:  bilateral exam  Vasc:  DP and PT pulses faintly palpable. TG: warm to cool. Multiple varicosities perimalleolar. No edema, no erythema   Derm: Right distal danielle-medial leg - fibrotic wound measuring about 3.5cmx1.6cmx0.1cm. No periwound erythema. No purulence/drainage/fluctuance  Left foot 5th digit - superficial lesion with a fibro-granular base measuring about [id number]. No periwound erythema/purulence/drainage/fluctuance  Neuro: protective sensation decreased  MSK: deferred at this time    IMAGING: pending    < from: Xray Tibia + Fibula 2 Views, Right (03.03.23 @ 16:36) >  ACC: [id number] EXAM:  XR TIB FIB AP LAT 2 VIEWS RT   ORDERED BY: QUETA FRIEDMAN     PROCEDURE DATE:  03/03/2023          INTERPRETATION:  Radiographs of the RIGHT tibia and fibula    CLINICAL INFORMATION: RIGHT lower extremity soft tissue ulceration..    TECHNIQUE:  Frontal and lateral views of the tibia and fibula were   obtained.    FINDINGS:  No prior studies are available for review.    The tibia and fibula are intact.  No fracture or gross osseous lytic/ blastic lesion..  No subcutaneous air or radiopaque foreign body. No radiographic evidence   of osteomyelitis..    IMPRESSION:   No acute radiographic osseous pathology..    --- End of Report ---    < end of copied text >    A:  Right distal leg superficial wound  Left foot 5th digit wound    P:   Patient evaluated and Chart reviewed   Discussed diagnosis and treatment with patient  Applied betadine to left foot 5th digit  Recommend Santyl application every other day to right leg wound  Continue with IV antibiotics As Per ID  Reviewed imaging   Podiatry to follow while in house  Seen bedside and discussed with Attending Dr. Montalvo

## 2023-03-17 NOTE — PHYSICAL THERAPY INITIAL EVALUATION ADULT - GENERAL OBSERVATIONS, REHAB EVAL
Patient received ambulating from bathroom to her bed using RW; ptient is AxOX3, hunched over posture, w/  Dowager's hump.

## 2023-03-17 NOTE — PROGRESS NOTE ADULT - PROBLEM SELECTOR PLAN 1
p/w swelling and pain of b/l Lower extremity  c/w abx: Unsayn  MYESHA negative for PAD  Podiatry following  Dr. Matta following
p/w swelling and pain of b/l Lower extremity  Started on abx: Unsayn  F/U blood cultures  MYESHA negative for PAD  Podiatry consulted  Dr. Matta consulted

## 2023-03-17 NOTE — PROGRESS NOTE ADULT - PROBLEM SELECTOR PLAN 3
Pt has a history of GERD.   Will continue pts home medication Protonix.
Pt has a history of GERD.   Will continue pts home medication Protonix.

## 2023-03-18 RX ADMIN — Medication 1 APPLICATION(S): at 11:03

## 2023-03-18 RX ADMIN — AMPICILLIN SODIUM AND SULBACTAM SODIUM 200 GRAM(S): 250; 125 INJECTION, POWDER, FOR SUSPENSION INTRAMUSCULAR; INTRAVENOUS at 11:03

## 2023-03-18 RX ADMIN — PANTOPRAZOLE SODIUM 40 MILLIGRAM(S): 20 TABLET, DELAYED RELEASE ORAL at 06:13

## 2023-03-18 RX ADMIN — ENOXAPARIN SODIUM 40 MILLIGRAM(S): 100 INJECTION SUBCUTANEOUS at 06:16

## 2023-03-18 RX ADMIN — AMPICILLIN SODIUM AND SULBACTAM SODIUM 200 GRAM(S): 250; 125 INJECTION, POWDER, FOR SUSPENSION INTRAMUSCULAR; INTRAVENOUS at 17:58

## 2023-03-18 RX ADMIN — Medication 1000 UNIT(S): at 11:03

## 2023-03-18 RX ADMIN — Medication 81 MILLIGRAM(S): at 11:03

## 2023-03-18 RX ADMIN — AMPICILLIN SODIUM AND SULBACTAM SODIUM 200 GRAM(S): 250; 125 INJECTION, POWDER, FOR SUSPENSION INTRAMUSCULAR; INTRAVENOUS at 00:52

## 2023-03-18 RX ADMIN — Medication 1 TABLET(S): at 11:03

## 2023-03-18 RX ADMIN — AMPICILLIN SODIUM AND SULBACTAM SODIUM 200 GRAM(S): 250; 125 INJECTION, POWDER, FOR SUSPENSION INTRAMUSCULAR; INTRAVENOUS at 06:13

## 2023-03-18 NOTE — PROGRESS NOTE ADULT - SUBJECTIVE AND OBJECTIVE BOX
67y Female is under our care for right leg cellulitis. Patient is doing well and is only c/o left pinky toe pain from recent callous removal by Podiatry. Admits right leg pain has improved as well as edema. Remains afebrile with normal wbc count.     MEDS:  ampicillin/sulbactam  IVPB 3 Gram(s) IV Intermittent every 6 hours    ALLERGIES: Allergies    No Known Allergies    Intolerances    REVIEW OF SYSTEMS:  [  ] Not able to elicit  General: no fevers no malaise  Chest: no cough no sob  GI: no nvd  : no urinary sxs   Skin: no rashes  Musculoskeletal: no trauma no LBP +toe pain  Neuro: no ha's no dizziness 	    VITALS:  Vital Signs Last 24 Hrs  T(C): 36.9 (18 Mar 2023 13:49), Max: 36.9 (17 Mar 2023 20:56)  T(F): 98.5 (18 Mar 2023 13:49), Max: 98.5 (18 Mar 2023 13:49)  HR: 84 (18 Mar 2023 13:49) (71 - 85)  BP: 116/61 (18 Mar 2023 13:49) (116/61 - 133/69)  BP(mean): --  RR: 18 (18 Mar 2023 13:49) (18 - 18)  SpO2: 100% (18 Mar 2023 13:49) (96% - 100%)    PHYSICAL EXAM:  HEENT: n/a  Neck: supple no LN's   Respiratory: lungs clear no rales  Cardiovascular: S1 S2 reg no murmurs  Gastrointestinal: +BS with soft, nondistended abdomen; nontender  Extremities: improved RLE edema  Skin: improved RLE erythema but has lingering warmth by ulcer site. Ulcer of right shin is present and moist due to the inappropriate dressing  Ortho: n/a  Neuro: AAO x 2-3    LABS/DIAGNOSTIC TESTS:                        11.6   4.67  )-----------( 201      ( 17 Mar 2023 07:50 )             37.4     WBC Count: 4.67 K/uL (03-17 @ 07:50)  WBC Count: 5.52 K/uL (03-16 @ 12:26)  WBC Count: 5.82 K/uL (03-15 @ 21:26)    03-17    148<H>  |  111<H>  |  15  ----------------------------<  94  3.9   |  29  |  0.63    Ca    9.0      17 Mar 2023 07:50      CULTURES: none    RADIOLOGY:  no new studies

## 2023-03-18 NOTE — PROGRESS NOTE ADULT - SUBJECTIVE AND OBJECTIVE BOX
Patient is a 67y old  Female who presents with a chief complaint of Cellulitis (18 Mar 2023 08:19)    PATIENT IS SEEN AND EXAMINED IN MEDICAL FLOOR.    ALLERGIES:  No Known Allergies    VITALS:    Vital Signs Last 24 Hrs  T(C): 36.8 (18 Mar 2023 05:32), Max: 36.9 (17 Mar 2023 20:56)  T(F): 98.3 (18 Mar 2023 05:32), Max: 98.4 (17 Mar 2023 20:56)  HR: 71 (18 Mar 2023 05:32) (71 - 85)  BP: 133/69 (18 Mar 2023 05:32) (118/55 - 146/68)  BP(mean): --  RR: 18 (18 Mar 2023 05:32) (18 - 18)  SpO2: 98% (18 Mar 2023 05:32) (96% - 98%)    Parameters below as of 18 Mar 2023 05:32  Patient On (Oxygen Delivery Method): room air        LABS:    CBC Full  -  ( 17 Mar 2023 07:50 )  WBC Count : 4.67 K/uL  RBC Count : 3.91 M/uL  Hemoglobin : 11.6 g/dL  Hematocrit : 37.4 %  Platelet Count - Automated : 201 K/uL  Mean Cell Volume : 95.7 fl  Mean Cell Hemoglobin : 29.7 pg  Mean Cell Hemoglobin Concentration : 31.0 gm/dL  Auto Neutrophil # : x  Auto Lymphocyte # : x  Auto Monocyte # : x  Auto Eosinophil # : x  Auto Basophil # : x  Auto Neutrophil % : x  Auto Lymphocyte % : x  Auto Monocyte % : x  Auto Eosinophil % : x  Auto Basophil % : x      03-17    148<H>  |  111<H>  |  15  ----------------------------<  94  3.9   |  29  |  0.63    Ca    9.0      17 Mar 2023 07:50      CAPILLARY BLOOD GLUCOSE              Creatinine Trend: 0.63<--, 0.57<--, 0.63<--, 0.64<--  I&O's Summary              MEDICATIONS:    MEDICATIONS  (STANDING):  ampicillin/sulbactam  IVPB 3 Gram(s) IV Intermittent every 6 hours  aspirin enteric coated 81 milliGRAM(s) Oral daily  cholecalciferol 1000 Unit(s) Oral daily  collagenase Ointment 1 Application(s) Topical daily  enoxaparin Injectable 40 milliGRAM(s) SubCutaneous every 24 hours  multivitamin 1 Tablet(s) Oral daily  pantoprazole    Tablet 40 milliGRAM(s) Oral before breakfast      MEDICATIONS  (PRN):  acetaminophen     Tablet .. 650 milliGRAM(s) Oral every 6 hours PRN Temp greater or equal to 38C (100.4F), Mild Pain (1 - 3)  aluminum hydroxide/magnesium hydroxide/simethicone Suspension 30 milliLiter(s) Oral every 4 hours PRN Dyspepsia  melatonin 3 milliGRAM(s) Oral at bedtime PRN Insomnia  ondansetron Injectable 4 milliGRAM(s) IV Push every 8 hours PRN Nausea and/or Vomiting      REVIEW OF SYSTEMS:                           ALL ROS DONE [ X   ]    CONSTITUTIONAL:  LETHARGIC [   ], FEVER [   ], UNRESPONSIVE [   ]  CVS:  CP  [   ], SOB, [   ], PALPITATIONS [   ], DIZZYNESS [   ]  RS: COUGH [   ], SPUTUM [   ]  GI: ABDOMINAL PAIN [   ], NAUSEA [   ], VOMITINGS [   ], DIARRHEA [   ], CONSTIPATION [   ]  :  DYSURIA [   ], NOCTURIA [   ], INCREASED FREQUENCY [   ], DRIBLING [   ],  SKELETAL: PAINFUL JOINTS [   ], SWOLLEN JOINTS [   ], NECK ACHE [   ], LOW BACK ACHE [   ],  SKIN : ULCERS [   ], RASH [   ], ITCHING [   ]  CNS: HEAD ACHE [   ], DOUBLE VISION [   ], BLURRED VISION [   ], AMS / CONFUSION [   ], SEIZURES [   ], WEAKNESS [   ],TINGLING / NUMBNESS [   ]      PHYSICAL EXAMINATION:  GENERAL APPEARANCE: NO DISTRESS  HEENT:  NO PALLOR, NO  JVD,  NO   NODES, NECK SUPPLE  CVS: S1 +, S2 +,   RS: AEEB,  OCCASIONAL  RALES +,   NO RONCHI  ABD: SOFT, NT, NO, BS +  EXT: NO PE  SKIN: WARM,  RIGHT CALF WRAPPED, LEFT FOOT WRAPPED  SKELETAL:  ROM ACCEPTABLE  CNS:  AAO X  3    RADIOLOGY :    RADIOLOGY AND READINGS REVIEWED    ASSESSMENT :     Cellulitis    Yes    GERD (gastroesophageal reflux disease)    H/O: depression    Osteoporosis    H/O personality disorder    No significant past surgical history        PLAN:  HPI:  This is a 67 year old female, coming from [facility], with no significant medical history coming in for b/l LE ulcers. She states she has had chronic ulcers that have been getting worse. She was being treated with doxy in the assisted living but her cellulitis did not improve. She continues to have painful ulcers on the lower extremities She denies any headaches, visual disturbances, N/V/D, dizziness, falls, chest pain, palpitations, lower extremity swelling, fevers, skin rash, recent travel, or sick contacts   (16 Mar 2023 01:39)    # RIGHT SHIN ULCER + LEFT FOOT WOUND W/ SUPERIMPOSE CELLULITIS, S/P OUTPATIENT ANTIBIOTICS COURSE  - UNASYN, F/U BCX  - NOTED MYESHA/PVR - W/OUT PAD  - F/U XRAY  - PODIATRY CONSULT IN PROGRESS  - ID CONSULT IN PROGRESS    # F/U PT EVAL    # SEVERE PROTEIN CALORIE MALNUTRITION  - NUTRITIONAL SUPPLEMENT    # GI AND DVT PPX

## 2023-03-18 NOTE — PROGRESS NOTE ADULT - SUBJECTIVE AND OBJECTIVE BOX
Podiatry Interval: Patient is seen sitting up eating breakfast. States her legs are not in pain. Overall feels better. No acute overnight events. Denies nausea, vomiting, fever, chills, SOB, diarrhea, constipation.     Podiatry HPI: Patient is a 67y old  Female who presents with a chief complaint of Cellulitis (16 Mar 2023 01:39). Patient reports no PMH except Anemia. She was referred to the ED because of a superficial wound to her right anteromedial distal leg. She is not sure how long the wound has been there. Patient also has a superficial wound to the 5th digit at her left foot. She admits no pain. No constitutional symptoms. No other pedal concerns. Patient does not like to have dressings on her feet or legs but agrees to a dressing application today.      HPI:  This is a 67 year old female, coming from [facility], with no significant medical history coming in for b/l LE ulcers. She states she has had chronic ulcers that have been getting worse. She was being treated with doxy in the assisted living but her cellulitis did not improve. She continues to have painful ulcers on the lower extremities She denies any headaches, visual disturbances, N/V/D, dizziness, falls, chest pain, palpitations, lower extremity swelling, fevers, skin rash, recent travel, or sick contacts   (16 Mar 2023 01:39)    Patient admits to  (-) Fevers, (-) Chills, (-) Nausea, (-) Vomiting, (-) Shortness of Breath (-) calf pain (-) chest pain     Medications acetaminophen     Tablet .. 650 milliGRAM(s) Oral every 6 hours PRN  aluminum hydroxide/magnesium hydroxide/simethicone Suspension 30 milliLiter(s) Oral every 4 hours PRN  ampicillin/sulbactam  IVPB 3 Gram(s) IV Intermittent every 6 hours  aspirin enteric coated 81 milliGRAM(s) Oral daily  cholecalciferol 1000 Unit(s) Oral daily  collagenase Ointment 1 Application(s) Topical daily  enoxaparin Injectable 40 milliGRAM(s) SubCutaneous every 24 hours  melatonin 3 milliGRAM(s) Oral at bedtime PRN  multivitamin 1 Tablet(s) Oral daily  ondansetron Injectable 4 milliGRAM(s) IV Push every 8 hours PRN  pantoprazole    Tablet 40 milliGRAM(s) Oral before breakfast    PMH:  GERD (gastroesophageal reflux disease)  H/O: depression  Osteoporosis  H/O personality disorder    Labs                          11.6   4.67  )-----------( 201      ( 17 Mar 2023 07:50 )             37.4      03-17    148<H>  |  111<H>  |  15  ----------------------------<  94  3.9   |  29  |  0.63    Ca    9.0      17 Mar 2023 07:50       Vital Signs Last 24 Hrs  T(C): 36.8 (18 Mar 2023 05:32), Max: 36.9 (17 Mar 2023 20:56)  T(F): 98.3 (18 Mar 2023 05:32), Max: 98.4 (17 Mar 2023 20:56)  HR: 71 (18 Mar 2023 05:32) (71 - 107)  BP: 133/69 (18 Mar 2023 05:32) (118/55 - 146/68)  RR: 18 (18 Mar 2023 05:32) (18 - 18)  SpO2: 98% (18 Mar 2023 05:32) (96% - 99%)    Parameters below as of 18 Mar 2023 05:32  Patient On (Oxygen Delivery Method): room air      PHYSICAL EXAM  LE Focused:  bilateral exam  Vasc:  DP and PT pulses faintly palpable. TG: warm to cool. Multiple varicosities perimalleolar. No edema, no erythema   Derm: Right distal danielle-medial leg - fibrotic wound measuring about 3.5cmx1.6cmx0.1cm. No periwound erythema. No purulence/drainage/fluctuance  Left foot 5th digit - superficial lesion with a fibro-granular base measuring about [id number]. No periwound erythema/purulence/drainage/fluctuance  Neuro: protective sensation decreased  MSK: deferred at this time    IMAGING: pending    < from: Xray Tibia + Fibula 2 Views, Right (03.03.23 @ 16:36) >  ACC: [id number] EXAM:  XR TIB FIB AP LAT 2 VIEWS RT   ORDERED BY: QUETA FRIEDMAN     PROCEDURE DATE:  03/03/2023          INTERPRETATION:  Radiographs of the RIGHT tibia and fibula    CLINICAL INFORMATION: RIGHT lower extremity soft tissue ulceration..    TECHNIQUE:  Frontal and lateral views of the tibia and fibula were   obtained.    FINDINGS:  No prior studies are available for review.    The tibia and fibula are intact.  No fracture or gross osseous lytic/ blastic lesion..  No subcutaneous air or radiopaque foreign body. No radiographic evidence   of osteomyelitis..    IMPRESSION:   No acute radiographic osseous pathology..    --- End of Report ---    < end of copied text >    A:  Right distal leg superficial wound  Left foot 5th digit wound    P:   Patient evaluated and Chart reviewed   Discussed diagnosis and treatment with patient  Applied betadine to left foot 5th digit  Recommend Santyl application every other day to right leg wound  Continue with IV antibiotics per ID  Reviewed imaging   Podiatry to follow while in house  Seen bedside and discussed with Attending Dr. Montalvo

## 2023-03-19 ENCOUNTER — TRANSCRIPTION ENCOUNTER (OUTPATIENT)
Age: 68
End: 2023-03-19

## 2023-03-19 LAB
ANION GAP SERPL CALC-SCNC: 5 MMOL/L — SIGNIFICANT CHANGE UP (ref 5–17)
BUN SERPL-MCNC: 18 MG/DL — SIGNIFICANT CHANGE UP (ref 7–18)
CALCIUM SERPL-MCNC: 9.3 MG/DL — SIGNIFICANT CHANGE UP (ref 8.4–10.5)
CHLORIDE SERPL-SCNC: 106 MMOL/L — SIGNIFICANT CHANGE UP (ref 96–108)
CO2 SERPL-SCNC: 33 MMOL/L — HIGH (ref 22–31)
CREAT SERPL-MCNC: 0.71 MG/DL — SIGNIFICANT CHANGE UP (ref 0.5–1.3)
EGFR: 92 ML/MIN/1.73M2 — SIGNIFICANT CHANGE UP
GLUCOSE SERPL-MCNC: 105 MG/DL — HIGH (ref 70–99)
POTASSIUM SERPL-MCNC: 3.8 MMOL/L — SIGNIFICANT CHANGE UP (ref 3.5–5.3)
POTASSIUM SERPL-SCNC: 3.8 MMOL/L — SIGNIFICANT CHANGE UP (ref 3.5–5.3)
SODIUM SERPL-SCNC: 144 MMOL/L — SIGNIFICANT CHANGE UP (ref 135–145)

## 2023-03-19 RX ADMIN — Medication 1 TABLET(S): at 11:42

## 2023-03-19 RX ADMIN — ENOXAPARIN SODIUM 40 MILLIGRAM(S): 100 INJECTION SUBCUTANEOUS at 06:11

## 2023-03-19 RX ADMIN — Medication 1 APPLICATION(S): at 11:56

## 2023-03-19 RX ADMIN — AMPICILLIN SODIUM AND SULBACTAM SODIUM 200 GRAM(S): 250; 125 INJECTION, POWDER, FOR SUSPENSION INTRAMUSCULAR; INTRAVENOUS at 05:54

## 2023-03-19 RX ADMIN — AMPICILLIN SODIUM AND SULBACTAM SODIUM 200 GRAM(S): 250; 125 INJECTION, POWDER, FOR SUSPENSION INTRAMUSCULAR; INTRAVENOUS at 00:28

## 2023-03-19 RX ADMIN — Medication 81 MILLIGRAM(S): at 11:42

## 2023-03-19 RX ADMIN — PANTOPRAZOLE SODIUM 40 MILLIGRAM(S): 20 TABLET, DELAYED RELEASE ORAL at 06:06

## 2023-03-19 RX ADMIN — AMPICILLIN SODIUM AND SULBACTAM SODIUM 200 GRAM(S): 250; 125 INJECTION, POWDER, FOR SUSPENSION INTRAMUSCULAR; INTRAVENOUS at 17:51

## 2023-03-19 RX ADMIN — Medication 1000 UNIT(S): at 11:42

## 2023-03-19 RX ADMIN — AMPICILLIN SODIUM AND SULBACTAM SODIUM 200 GRAM(S): 250; 125 INJECTION, POWDER, FOR SUSPENSION INTRAMUSCULAR; INTRAVENOUS at 11:42

## 2023-03-19 NOTE — DISCHARGE NOTE PROVIDER - NSDCMRMEDTOKEN_GEN_ALL_CORE_FT
Aspirin Enteric Coated 81 mg oral delayed release tablet: 1 tab(s) orally once a day  halobetasol 0.05% topical cream: Apply topically to affected area (b/l extremities) once a day  Lac-Hydrin 12% topical cream: Apply topically to affected area (feet and legs) 2 times a day  Multiple Vitamins oral tablet: 1 tab(s) orally once a day  mupirocin 2% topical ointment: Apply topically to affected area (left foot wound) once a day  Protonix 40 mg oral delayed release tablet: 1 tab(s) orally once a day  Tylenol 325 mg oral tablet: 2 tab(s) orally every 8 hours, As Needed for pain  Vitamin D3 25 mcg (1000 intl units) oral tablet: 1 tab(s) orally once a day   Aspirin Enteric Coated 81 mg oral delayed release tablet: 1 tab(s) orally once a day  Augmentin 500 mg-125 mg oral tablet: 1 tab(s) orally 2 times a day   collagenase 250 units/g topical ointment: Apply topically to affected area every other day (at bedtime)  Multiple Vitamins oral tablet: 1 tab(s) orally once a day  Protonix 40 mg oral delayed release tablet: 1 tab(s) orally once a day  Tylenol 325 mg oral tablet: 2 tab(s) orally every 8 hours, As Needed for pain  Vitamin D3 25 mcg (1000 intl units) oral tablet: 1 tab(s) orally once a day

## 2023-03-19 NOTE — DISCHARGE NOTE PROVIDER - NSFOLLOWUPCLINICS_GEN_ALL_ED_FT
[facility]  Podiatry/Wound Care  [address] [place] [address]  Phone: [phone]  Fax: [phone]  Follow Up Time: 1 week

## 2023-03-19 NOTE — PROGRESS NOTE ADULT - SUBJECTIVE AND OBJECTIVE BOX
Podiatry Interval: Patient is seen resting in bedt. States her legs are not in pain. Overall feels better. No acute overnight events. Denies nausea, vomiting, fever, chills, SOB, diarrhea, constipation.     Podiatry HPI: Patient is a 67y old  Female who presents with a chief complaint of Cellulitis (16 Mar 2023 01:39). Patient reports no PMH except Anemia. She was referred to the ED because of a superficial wound to her right anteromedial distal leg. She is not sure how long the wound has been there. Patient also has a superficial wound to the 5th digit at her left foot. She admits no pain. No constitutional symptoms. No other pedal concerns. Patient does not like to have dressings on her feet or legs but agrees to a dressing application today.    HPI:  This is a 67 year old female, coming from [facility], with no significant medical history coming in for b/l LE ulcers. She states she has had chronic ulcers that have been getting worse. She was being treated with doxy in the assisted living but her cellulitis did not improve. She continues to have painful ulcers on the lower extremities She denies any headaches, visual disturbances, N/V/D, dizziness, falls, chest pain, palpitations, lower extremity swelling, fevers, skin rash, recent travel, or sick contacts   (16 Mar 2023 01:39)    Patient admits to  (-) Fevers, (-) Chills, (-) Nausea, (-) Vomiting, (-) Shortness of Breath (-) calf pain (-) chest pain     Medications acetaminophen     Tablet .. 650 milliGRAM(s) Oral every 6 hours PRN  aluminum hydroxide/magnesium hydroxide/simethicone Suspension 30 milliLiter(s) Oral every 4 hours PRN  ampicillin/sulbactam  IVPB 3 Gram(s) IV Intermittent every 6 hours  aspirin enteric coated 81 milliGRAM(s) Oral daily  cholecalciferol 1000 Unit(s) Oral daily  collagenase Ointment 1 Application(s) Topical daily  enoxaparin Injectable 40 milliGRAM(s) SubCutaneous every 24 hours  melatonin 3 milliGRAM(s) Oral at bedtime PRN  multivitamin 1 Tablet(s) Oral daily  ondansetron Injectable 4 milliGRAM(s) IV Push every 8 hours PRN  pantoprazole    Tablet 40 milliGRAM(s) Oral before breakfast    FHNo pertinent family history in first degree relatives    ,   PMHGERD (gastroesophageal reflux disease)    H/O: depression    Osteoporosis    H/O personality disorder       PSHNo significant past surgical history        Labs       03-19    144  |  106  |  18  ----------------------------<  105<H>  3.8   |  33<H>  |  0.71    Ca    9.3      19 Mar 2023 06:05       Vital Signs Last 24 Hrs  T(C): 36.6 (19 Mar 2023 05:19), Max: 37.1 (18 Mar 2023 20:47)  T(F): 97.8 (19 Mar 2023 05:19), Max: 98.7 (18 Mar 2023 20:47)  HR: 80 (19 Mar 2023 05:19) (74 - 84)  BP: 139/64 (19 Mar 2023 05:19) (116/61 - 139/64)  BP(mean): --  RR: 16 (19 Mar 2023 05:19) (16 - 18)  SpO2: 96% (19 Mar 2023 05:19) (96% - 100%)    Parameters below as of 19 Mar 2023 05:19  Patient On (Oxygen Delivery Method): room air      PHYSICAL EXAM  LE Focused:  bilateral exam  Vasc:  DP and PT pulses faintly palpable. TG: warm to cool. Multiple varicosities perimalleolar. No edema, no erythema   Derm: Right distal danielle-medial leg - fibrotic wound measuring about 3.5cmx1.6cmx0.1cm. No periwound erythema. No purulence/drainage/fluctuance  Left foot 5th digit - superficial lesion with a fibro-granular base measuring about 0wnx8fo. No periwound erythema/purulence/drainage/fluctuance  Neuro: protective sensation decreased  MSK: deferred at this time    IMAGING: pending    < from: Xray Tibia + Fibula 2 Views, Right (03.03.23 @ 16:36) >  ACC: [id number] EXAM:  XR TIB FIB AP LAT 2 VIEWS RT   ORDERED BY: QUETA FRIEDMAN     PROCEDURE DATE:  03/03/2023          INTERPRETATION:  Radiographs of the RIGHT tibia and fibula    CLINICAL INFORMATION: RIGHT lower extremity soft tissue ulceration..    TECHNIQUE:  Frontal and lateral views of the tibia and fibula were   obtained.    FINDINGS:  No prior studies are available for review.    The tibia and fibula are intact.  No fracture or gross osseous lytic/ blastic lesion..  No subcutaneous air or radiopaque foreign body. No radiographic evidence   of osteomyelitis..    IMPRESSION:   No acute radiographic osseous pathology..    --- End of Report ---    < end of copied text >    A:  Right distal leg superficial wound  Left foot 5th digit wound    P:   Patient evaluated and Chart reviewed   Discussed diagnosis and treatment with patient  Applied betadine to left foot 5th digit  Recommend Santyl application every other day to right leg wound  Continue with IV antibiotics per ID  Reviewed imaging   Podiatry to follow while in house  Seen bedside and discussed with Attending Dr. Montalvo

## 2023-03-19 NOTE — DISCHARGE NOTE PROVIDER - NSDCCPCAREPLAN_GEN_ALL_CORE_FT
PRINCIPAL DISCHARGE DIAGNOSIS  Diagnosis: Cellulitis  Assessment and Plan of Treatment: You presented with bilateral low extremities ulcers.   You received course of intravenous antibiotics.   Continue antibiotics as prescribed.   Continue wound care : Santyl to right leg wound every other day.   Physical therapy as instructed.   Report to your doctor or seek immediate medical attention if you develop any changes in your condition and or worsening signs/symptoms (such as and not limited : non-healing wound accompanied with  fever, drainage from wound, pain ).  Follow-up with your primary care physician within 1 week. Call for appointment.        SECONDARY DISCHARGE DIAGNOSES  Diagnosis: GERD (gastroesophageal reflux disease)  Assessment and Plan of Treatment: Change the factors that you can control. Ask your caregiver for guidance concerning weight loss, quitting smoking, and alcohol consumption. Avoid foods and drinks that make your symptoms worse, such as: Caffeine or alcoholic drinks. Chocolate. spicy foods. Citrus fruits tomato-based foods, Fried and fatty foods.  Avoid lying down for the 3 hours after eating .Eat small, frequent meals  Do not wear anything tight around your waist that causes pressure on your stomach. Raise the head of your bed 6 to 8 inches with wood blocks to help you sleep. Do not take aspirin, ibuprofen, or other nonsteroidal anti-inflammatory drugs (NSAIDs).You have pain in your arms, neck, jaw, teeth, or back. Your pain increases or changes in intensity or duration. You develop nausea, vomiting, or sweating (diaphoresis).You develop shortness of breath, or you faint.Your vomit is green, yellow, black, or looks like coffee grounds or blood.Your stool is red, bloody, or black.These symptoms could be signs of other problems, such as heart disease, gastric bleeding, or esophageal bleeding.      Diagnosis: Osteoporosis  Assessment and Plan of Treatment: Continue medications as prescribed by your doctor.   Follow-up with your primary care physician       PRINCIPAL DISCHARGE DIAGNOSIS  Diagnosis: Bilateral lower leg cellulitis  Assessment and Plan of Treatment: you came into the hospital for a wound on both your legs   You received course of intravenous antibiotics.   Continue wound care : Santyl to right leg wound every other day.   Physical therapy as instructed.   continue taking antibiotic augmentin 500 mg twice a day for 2 more days  Report to your doctor or seek immediate medical attention if you develop any changes in your condition and or worsening signs/symptoms (such as and not limited : non-healing wound accompanied with  fever, drainage from wound, pain ).  follow up with podiatry clinic in 1-2 weeks.      SECONDARY DISCHARGE DIAGNOSES  Diagnosis: Hypokalemia  Assessment and Plan of Treatment: during your hospitalization you had low potassium and given electrolyte supplements and now back to normal.    Diagnosis: GERD (gastroesophageal reflux disease)  Assessment and Plan of Treatment: continue taking protonix 40 mg daily    Diagnosis: Osteoporosis  Assessment and Plan of Treatment: continue taking cholecalciferol 1000 units daily     PRINCIPAL DISCHARGE DIAGNOSIS  Diagnosis: Bilateral lower leg cellulitis  Assessment and Plan of Treatment: you came into the hospital for a wound on both your legs   You received course of intravenous antibiotics.   Continue wound care : Santyl to right leg wound every other day.   Physical therapy as instructed.   continue taking antibiotic augmentin 500 mg twice a day for 2 more days until 3/22  Report to your doctor or seek immediate medical attention if you develop any changes in your condition and or worsening signs/symptoms (such as and not limited : non-healing wound accompanied with  fever, drainage from wound, pain ).  follow up with podiatry clinic in 1-2 weeks.      SECONDARY DISCHARGE DIAGNOSES  Diagnosis: Hypokalemia  Assessment and Plan of Treatment: during your hospitalization you had low potassium and given electrolyte supplements and now back to normal.    Diagnosis: GERD (gastroesophageal reflux disease)  Assessment and Plan of Treatment: continue taking protonix 40 mg daily    Diagnosis: Osteoporosis  Assessment and Plan of Treatment: continue taking cholecalciferol 1000 units daily

## 2023-03-19 NOTE — PROGRESS NOTE ADULT - ASSESSMENT
Right leg cellulitis - improved    Plan:  ·	Cont Unasyn 3gms iv q6hrs, if planning dc tomorrow can switch to augmentin 500mg PO BID x 3 days  ·	reconsult prn  
Right leg cellulitis - improving    Plan:  ·	Cont Unasyn 3gms iv q6hrs  D3  
67 year old female, coming from [facility], with no significant medical history presented with b/l LE ulcers.  Xray negative for findings. Patient admitted to medicine for b/l LE cellulitis. Started abx , f/u blood cx.  Doppler LE negative for DVT.  ID Dr. Cruz and podiatry consulted
67 year old female, coming from [facility], with no significant medical history presented with b/l LE ulcers.  Xray negative for findings. Patient admitted to medicine for left LE cellulitis. Started unasyn,  doppler LE negative for DVT.  ID Dr. Cruz and podiatry following.     Pt  with left foot pain, x ray left foot pending results. PT recommends home PT. Continue unasyn until 03/19.

## 2023-03-19 NOTE — DISCHARGE NOTE PROVIDER - HOSPITAL COURSE
67 year old female, coming from [facility], with no significant medical history presented with b/l LE ulcers.  Xray negative for findings. Patient admitted to medicine for RLE cellulitis. Started unasyn,  doppler LE negative for DVT.  MYESHA negative for PAD.   Right shin ulcer is stable and no longer has associated right leg pain. Remains afebrile with normal wbc count.    Continue Unasyn .  Can switch to augmentin 500mg PO BID x 3 days (thru 3/22).  Wound care per Podiatry : Applied betadine to left foot 5th digit. Recommend Santyl application every other day to right leg wound. ID Dr. Cruz and Podiatry following. Pt  with left foot pain, x ray left foot   5th digit findings ------>>>   PT recommends home PT.   Hospital course complicated by hypokalemia, repleted. Resolved.        -----------Incomplete -------------    Pt is medically optimized for discharge. Continue medications as instructed. Follow-up with PCP.   This is brief summary of patient's hospitalization. Refer to medical record for complete details of hospital course.       67 year old female, coming from [facility], with no significant medical history presented with b/l LE ulcers.  Xray negative for findings. Patient admitted to medicine for RLE cellulitis. Started unasyn,  doppler LE negative for DVT.  MYESHA negative for PAD.   Right shin ulcer is stable and no longer has associated right leg pain. Remains afebrile with normal wbc count.    Continue Unasyn .  Can switch to augmentin 500mg PO BID x 3 days (thru 3/22).  Wound care per Podiatry : Applied betadine to left foot 5th digit. Recommend Santyl application every other day to right leg wound. ID Dr. Cruz and Podiatry following. Pt  with left foot pain, x ray left foot   5th digit findings ------>>>  PT recommends home PT.   hypokalemia resolved.     Pt is medically optimized for discharge. Continue medications as instructed. Follow-up with PCP.   This is brief summary of patient's hospitalization. Refer to medical record for complete details of hospital course.       67 year old female, coming from [facility], with no significant medical history presented with b/l LE ulcers.  Xray negative for findings. Patient admitted to medicine for RLE cellulitis. Started unasyn,  doppler LE negative for DVT.  MYESHA negative for PAD.   Right shin ulcer is stable and no longer has associated right leg pain. Remains afebrile with normal wbc count.    Continue Unasyn .  will dc on augmentin 500mg PO BID x 2 days until 3/22.  Wound care per Podiatry : Applied betadine to left foot 5th digit. Recommend Santyl application every other day to right leg wound. ID Dr. Cruz and Podiatry following.   PT recommends home PT.   hypokalemia resolved.     Pt is medically optimized for discharge. Continue medications as instructed. Follow-up with PCP.   This is brief summary of patient's hospitalization. Refer to medical record for complete details of hospital course.

## 2023-03-19 NOTE — PROGRESS NOTE ADULT - PROVIDER SPECIALTY LIST ADULT
Internal Medicine
Infectious Disease
Internal Medicine
Podiatry
Podiatry
Infectious Disease
Internal Medicine
Podiatry
Internal Medicine
Internal Medicine

## 2023-03-19 NOTE — DISCHARGE NOTE PROVIDER - CARE PROVIDER_API CALL
Estiven Molina)  Medicine  [address], [address] [place] [address]  Phone: [phone]  Fax: [phone]  Follow Up Time: 1 week

## 2023-03-19 NOTE — PROGRESS NOTE ADULT - SUBJECTIVE AND OBJECTIVE BOX
Patient is a 67y old  Female who presents with a chief complaint of Cellulitis (19 Mar 2023 12:00)    PATIENT IS SEEN AND EXAMINED IN MEDICAL FLOOR.  MARQUITA [    ]    KEVIN [   ]      GT [   ]    ALLERGIES:  No Known Allergies      Daily     Daily     VITALS:    Vital Signs Last 24 Hrs  T(C): 36.9 (19 Mar 2023 13:48), Max: 37.1 (18 Mar 2023 20:47)  T(F): 98.5 (19 Mar 2023 13:48), Max: 98.7 (18 Mar 2023 20:47)  HR: 84 (19 Mar 2023 13:48) (74 - 84)  BP: 112/57 (19 Mar 2023 13:48) (112/57 - 139/64)  BP(mean): --  RR: 17 (19 Mar 2023 13:48) (16 - 17)  SpO2: 97% (19 Mar 2023 13:48) (96% - 98%)    Parameters below as of 19 Mar 2023 13:48  Patient On (Oxygen Delivery Method): room air        LABS:        03-19    144  |  106  |  18  ----------------------------<  105<H>  3.8   |  33<H>  |  0.71    Ca    9.3      19 Mar 2023 06:05      CAPILLARY BLOOD GLUCOSE              Creatinine Trend: 0.71<--, 0.63<--, 0.57<--, 0.63<--, 0.64<--  I&O's Summary              MEDICATIONS:    MEDICATIONS  (STANDING):  ampicillin/sulbactam  IVPB 3 Gram(s) IV Intermittent every 6 hours  aspirin enteric coated 81 milliGRAM(s) Oral daily  cholecalciferol 1000 Unit(s) Oral daily  collagenase Ointment 1 Application(s) Topical daily  enoxaparin Injectable 40 milliGRAM(s) SubCutaneous every 24 hours  multivitamin 1 Tablet(s) Oral daily  pantoprazole    Tablet 40 milliGRAM(s) Oral before breakfast      MEDICATIONS  (PRN):  acetaminophen     Tablet .. 650 milliGRAM(s) Oral every 6 hours PRN Temp greater or equal to 38C (100.4F), Mild Pain (1 - 3)  aluminum hydroxide/magnesium hydroxide/simethicone Suspension 30 milliLiter(s) Oral every 4 hours PRN Dyspepsia  melatonin 3 milliGRAM(s) Oral at bedtime PRN Insomnia  ondansetron Injectable 4 milliGRAM(s) IV Push every 8 hours PRN Nausea and/or Vomiting      REVIEW OF SYSTEMS:                           ALL ROS DONE [ X   ]    CONSTITUTIONAL:  LETHARGIC [   ], FEVER [   ], UNRESPONSIVE [   ]  CVS:  CP  [   ], SOB, [   ], PALPITATIONS [   ], DIZZYNESS [   ]  RS: COUGH [   ], SPUTUM [   ]  GI: ABDOMINAL PAIN [   ], NAUSEA [   ], VOMITINGS [   ], DIARRHEA [   ], CONSTIPATION [   ]  :  DYSURIA [   ], NOCTURIA [   ], INCREASED FREQUENCY [   ], DRIBLING [   ],  SKELETAL: PAINFUL JOINTS [   ], SWOLLEN JOINTS [   ], NECK ACHE [   ], LOW BACK ACHE [   ],  SKIN : ULCERS [   ], RASH [   ], ITCHING [   ]  CNS: HEAD ACHE [   ], DOUBLE VISION [   ], BLURRED VISION [   ], AMS / CONFUSION [   ], SEIZURES [   ], WEAKNESS [   ],TINGLING / NUMBNESS [   ]    PHYSICAL EXAMINATION:  GENERAL APPEARANCE: NO DISTRESS  HEENT:  NO PALLOR, NO  JVD,  NO   NODES, NECK SUPPLE  CVS: S1 +, S2 +,   RS: AEEB,  OCCASIONAL  RALES +,   NO RONCHI  ABD: SOFT, NT, NO, BS +  EXT: NO PE  SKIN: WARM,  RIGHT CALF WRAPPED, LEFT FOOT WRAPPED  SKELETAL:  ROM ACCEPTABLE  CNS:  AAO X  3    RADIOLOGY :    RADIOLOGY AND READINGS REVIEWED    ASSESSMENT :     Cellulitis    Yes    GERD (gastroesophageal reflux disease)    H/O: depression    Osteoporosis    H/O personality disorder    No significant past surgical history        PLAN:  HPI:  This is a 67 year old female, coming from [facility], with no significant medical history coming in for b/l LE ulcers. She states she has had chronic ulcers that have been getting worse. She was being treated with doxy in the assisted living but her cellulitis did not improve. She continues to have painful ulcers on the lower extremities She denies any headaches, visual disturbances, N/V/D, dizziness, falls, chest pain, palpitations, lower extremity swelling, fevers, skin rash, recent travel, or sick contacts   (16 Mar 2023 01:39)    # RIGHT SHIN ULCER + LEFT FOOT WOUND W/ SUPERIMPOSE CELLULITIS, S/P OUTPATIENT ANTIBIOTICS COURSE  - UNASYN, F/U BCX  - NOTED MYESHA/PVR - W/OUT PAD  - F/U XRAY  - PODIATRY CONSULT IN PROGRESS  - ID CONSULT IN PROGRESS    # F/U PT EVAL    # SEVERE PROTEIN CALORIE MALNUTRITION  - NUTRITIONAL SUPPLEMENT    # GI AND DVT PPX   Patient is a 67y old  Female who presents with a chief complaint of Cellulitis (19 Mar 2023 12:00)    PATIENT IS SEEN AND EXAMINED IN MEDICAL FLOOR. PATIENT REPORTS IMPROVEMENT IN FOOT PAIN.    ALLERGIES:  No Known Allergies    VITALS:    Vital Signs Last 24 Hrs  T(C): 36.9 (19 Mar 2023 13:48), Max: 37.1 (18 Mar 2023 20:47)  T(F): 98.5 (19 Mar 2023 13:48), Max: 98.7 (18 Mar 2023 20:47)  HR: 84 (19 Mar 2023 13:48) (74 - 84)  BP: 112/57 (19 Mar 2023 13:48) (112/57 - 139/64)  BP(mean): --  RR: 17 (19 Mar 2023 13:48) (16 - 17)  SpO2: 97% (19 Mar 2023 13:48) (96% - 98%)    Parameters below as of 19 Mar 2023 13:48  Patient On (Oxygen Delivery Method): room air        LABS:        03-19    144  |  106  |  18  ----------------------------<  105<H>  3.8   |  33<H>  |  0.71    Ca    9.3      19 Mar 2023 06:05      CAPILLARY BLOOD GLUCOSE              Creatinine Trend: 0.71<--, 0.63<--, 0.57<--, 0.63<--, 0.64<--  I&O's Summary              MEDICATIONS:    MEDICATIONS  (STANDING):  ampicillin/sulbactam  IVPB 3 Gram(s) IV Intermittent every 6 hours  aspirin enteric coated 81 milliGRAM(s) Oral daily  cholecalciferol 1000 Unit(s) Oral daily  collagenase Ointment 1 Application(s) Topical daily  enoxaparin Injectable 40 milliGRAM(s) SubCutaneous every 24 hours  multivitamin 1 Tablet(s) Oral daily  pantoprazole    Tablet 40 milliGRAM(s) Oral before breakfast      MEDICATIONS  (PRN):  acetaminophen     Tablet .. 650 milliGRAM(s) Oral every 6 hours PRN Temp greater or equal to 38C (100.4F), Mild Pain (1 - 3)  aluminum hydroxide/magnesium hydroxide/simethicone Suspension 30 milliLiter(s) Oral every 4 hours PRN Dyspepsia  melatonin 3 milliGRAM(s) Oral at bedtime PRN Insomnia  ondansetron Injectable 4 milliGRAM(s) IV Push every 8 hours PRN Nausea and/or Vomiting      REVIEW OF SYSTEMS:                           ALL ROS DONE [ X   ]    CONSTITUTIONAL:  LETHARGIC [   ], FEVER [   ], UNRESPONSIVE [   ]  CVS:  CP  [   ], SOB, [   ], PALPITATIONS [   ], DIZZYNESS [   ]  RS: COUGH [   ], SPUTUM [   ]  GI: ABDOMINAL PAIN [   ], NAUSEA [   ], VOMITINGS [   ], DIARRHEA [   ], CONSTIPATION [   ]  :  DYSURIA [   ], NOCTURIA [   ], INCREASED FREQUENCY [   ], DRIBLING [   ],  SKELETAL: PAINFUL JOINTS [   ], SWOLLEN JOINTS [   ], NECK ACHE [   ], LOW BACK ACHE [   ],  SKIN : ULCERS [   ], RASH [   ], ITCHING [   ]  CNS: HEAD ACHE [   ], DOUBLE VISION [   ], BLURRED VISION [   ], AMS / CONFUSION [   ], SEIZURES [   ], WEAKNESS [   ],TINGLING / NUMBNESS [   ]    PHYSICAL EXAMINATION:  GENERAL APPEARANCE: NO DISTRESS  HEENT:  NO PALLOR, NO  JVD,  NO   NODES, NECK SUPPLE  CVS: S1 +, S2 +,   RS: AEEB,  OCCASIONAL  RALES +,   NO RONCHI  ABD: SOFT, NT, NO, BS +  EXT: NO PE  SKIN: WARM,  RIGHT CALF WRAPPED, LEFT FOOT WRAPPED  SKELETAL:  ROM ACCEPTABLE  CNS:  AAO X  3    RADIOLOGY :    RADIOLOGY AND READINGS REVIEWED    ASSESSMENT :     Cellulitis    Yes    GERD (gastroesophageal reflux disease)    H/O: depression    Osteoporosis    H/O personality disorder    No significant past surgical history        PLAN:  HPI:  This is a 67 year old female, coming from [facility], with no significant medical history coming in for b/l LE ulcers. She states she has had chronic ulcers that have been getting worse. She was being treated with doxy in the assisted living but her cellulitis did not improve. She continues to have painful ulcers on the lower extremities She denies any headaches, visual disturbances, N/V/D, dizziness, falls, chest pain, palpitations, lower extremity swelling, fevers, skin rash, recent travel, or sick contacts   (16 Mar 2023 01:39)    # D/C IN AM TO [facility] FACILITY IF MEDICALLY STABLE WITH AUGMENTIN BID X 3 DAYS    # CASE D/W FRIEND CORNELL AT BEDSIDE    # RIGHT SHIN ULCER + LEFT FOOT WOUND W/ SUPERIMPOSE CELLULITIS, S/P OUTPATIENT ANTIBIOTICS COURSE  - UNASYN, F/U BCX - NGTD  - NOTED MYESHA/PVR - W/OUT PAD  - F/U XRAY  - PODIATRY CONSULT IN PROGRESS  - ID CONSULT IN PROGRESS    # F/U PT EVAL    # SEVERE PROTEIN CALORIE MALNUTRITION  - NUTRITIONAL SUPPLEMENT    # AMBULATORY DYSFUNCTION S/T KYPHOSIS, PERIPHERAL NEUROPATHY, VENOUS INSUFFICIENCY  - HOME W/ HOME PT    # GI AND DVT PPX

## 2023-03-19 NOTE — PROGRESS NOTE ADULT - SUBJECTIVE AND OBJECTIVE BOX
67y Female is under our care for right leg cellulitis. Patient continues to do well and has left pinky toe pain is slightly less today. Right shin ulcer is stable and no longer has associated right leg pain. Remains afebrile with normal wbc count.     MEDS:  ampicillin/sulbactam  IVPB 3 Gram(s) IV Intermittent every 6 hours    ALLERGIES: Allergies    No Known Allergies    Intolerances    REVIEW OF SYSTEMS:  [  ] Not able to elicit  General: no fevers no malaise  Chest: no cough no sob  GI: no nvd  : no urinary sxs   Skin: no rashes  Musculoskeletal: no trauma no LBP +toe pain  Neuro: no ha's no dizziness 	    VITALS:  Vital Signs Last 24 Hrs  T(C): 36.6 (19 Mar 2023 05:19), Max: 37.1 (18 Mar 2023 20:47)  T(F): 97.8 (19 Mar 2023 05:19), Max: 98.7 (18 Mar 2023 20:47)  HR: 80 (19 Mar 2023 05:19) (74 - 84)  BP: 139/64 (19 Mar 2023 05:19) (116/61 - 139/64)  BP(mean): --  RR: 16 (19 Mar 2023 05:19) (16 - 18)  SpO2: 96% (19 Mar 2023 05:19) (96% - 100%)    PHYSICAL EXAM:  HEENT: n/a  Neck: supple no LN's   Respiratory: lungs clear no rales  Cardiovascular: S1 S2 reg no murmurs  Gastrointestinal: +BS with soft, nondistended abdomen; nontender  Extremities: improved RLE edema  Skin: Improved RLE erythema but has lingering warmth by ulcer site. Ulcer of right shin remains moist, had advised nursing team yesterday that they should avoiding placing Allavin dressing.  Ortho: n/a  Neuro: AAO x 2-3    LABS/DIAGNOSTIC TESTS:    03-19    144  |  106  |  18  ----------------------------<  105<H>  3.8   |  33<H>  |  0.71    Ca    9.3      19 Mar 2023 06:05        CULTURES: none    RADIOLOGY:  no new studies

## 2023-03-20 ENCOUNTER — TRANSCRIPTION ENCOUNTER (OUTPATIENT)
Age: 68
End: 2023-03-20

## 2023-03-20 VITALS
DIASTOLIC BLOOD PRESSURE: 58 MMHG | TEMPERATURE: 98 F | HEART RATE: 85 BPM | SYSTOLIC BLOOD PRESSURE: 102 MMHG | RESPIRATION RATE: 17 BRPM | OXYGEN SATURATION: 100 %

## 2023-03-20 LAB
ANION GAP SERPL CALC-SCNC: 4 MMOL/L — LOW (ref 5–17)
BUN SERPL-MCNC: 19 MG/DL — HIGH (ref 7–18)
CALCIUM SERPL-MCNC: 9.1 MG/DL — SIGNIFICANT CHANGE UP (ref 8.4–10.5)
CHLORIDE SERPL-SCNC: 110 MMOL/L — HIGH (ref 96–108)
CO2 SERPL-SCNC: 33 MMOL/L — HIGH (ref 22–31)
CREAT SERPL-MCNC: 0.65 MG/DL — SIGNIFICANT CHANGE UP (ref 0.5–1.3)
EGFR: 95 ML/MIN/1.73M2 — SIGNIFICANT CHANGE UP
GLUCOSE SERPL-MCNC: 93 MG/DL — SIGNIFICANT CHANGE UP (ref 70–99)
HCT VFR BLD CALC: 35.7 % — SIGNIFICANT CHANGE UP (ref 34.5–45)
HGB BLD-MCNC: 11 G/DL — LOW (ref 11.5–15.5)
MCHC RBC-ENTMCNC: 29.7 PG — SIGNIFICANT CHANGE UP (ref 27–34)
MCHC RBC-ENTMCNC: 30.8 GM/DL — LOW (ref 32–36)
MCV RBC AUTO: 96.5 FL — SIGNIFICANT CHANGE UP (ref 80–100)
NRBC # BLD: 0 /100 WBCS — SIGNIFICANT CHANGE UP (ref 0–0)
PLATELET # BLD AUTO: 180 K/UL — SIGNIFICANT CHANGE UP (ref 150–400)
POTASSIUM SERPL-MCNC: 3.8 MMOL/L — SIGNIFICANT CHANGE UP (ref 3.5–5.3)
POTASSIUM SERPL-SCNC: 3.8 MMOL/L — SIGNIFICANT CHANGE UP (ref 3.5–5.3)
RAPID RVP RESULT: SIGNIFICANT CHANGE UP
RBC # BLD: 3.7 M/UL — LOW (ref 3.8–5.2)
RBC # FLD: 12.6 % — SIGNIFICANT CHANGE UP (ref 10.3–14.5)
SARS-COV-2 RNA SPEC QL NAA+PROBE: SIGNIFICANT CHANGE UP
SODIUM SERPL-SCNC: 147 MMOL/L — HIGH (ref 135–145)
WBC # BLD: 5.45 K/UL — SIGNIFICANT CHANGE UP (ref 3.8–10.5)
WBC # FLD AUTO: 5.45 K/UL — SIGNIFICANT CHANGE UP (ref 3.8–10.5)

## 2023-03-20 PROCEDURE — 87637 SARSCOV2&INF A&B&RSV AMP PRB: CPT

## 2023-03-20 PROCEDURE — 99285 EMERGENCY DEPT VISIT HI MDM: CPT | Mod: 25

## 2023-03-20 PROCEDURE — 93923 UPR/LXTR ART STDY 3+ LVLS: CPT

## 2023-03-20 PROCEDURE — 80048 BASIC METABOLIC PNL TOTAL CA: CPT

## 2023-03-20 PROCEDURE — 36415 COLL VENOUS BLD VENIPUNCTURE: CPT

## 2023-03-20 PROCEDURE — 87640 STAPH A DNA AMP PROBE: CPT

## 2023-03-20 PROCEDURE — 96372 THER/PROPH/DIAG INJ SC/IM: CPT

## 2023-03-20 PROCEDURE — 80053 COMPREHEN METABOLIC PANEL: CPT

## 2023-03-20 PROCEDURE — 0225U NFCT DS DNA&RNA 21 SARSCOV2: CPT

## 2023-03-20 PROCEDURE — 85027 COMPLETE CBC AUTOMATED: CPT

## 2023-03-20 PROCEDURE — 97161 PT EVAL LOW COMPLEX 20 MIN: CPT

## 2023-03-20 PROCEDURE — 85025 COMPLETE CBC W/AUTO DIFF WBC: CPT

## 2023-03-20 PROCEDURE — 87641 MR-STAPH DNA AMP PROBE: CPT

## 2023-03-20 PROCEDURE — 86803 HEPATITIS C AB TEST: CPT

## 2023-03-20 RX ORDER — COLLAGENASE CLOSTRIDIUM HIST. 250 UNIT/G
1 OINTMENT (GRAM) TOPICAL
Refills: 0 | Status: DISCONTINUED | OUTPATIENT
Start: 2023-03-20 | End: 2023-03-20

## 2023-03-20 RX ORDER — MUPIROCIN 20 MG/G
1 OINTMENT TOPICAL
Qty: 0 | Refills: 0 | DISCHARGE

## 2023-03-20 RX ORDER — COLLAGENASE CLOSTRIDIUM HIST. 250 UNIT/G
1 OINTMENT (GRAM) TOPICAL
Qty: 0 | Refills: 0 | DISCHARGE
Start: 2023-03-20

## 2023-03-20 RX ORDER — SOD,AMMONIUM,POTASSIUM LACTATE
1 CREAM (GRAM) TOPICAL
Qty: 0 | Refills: 0 | DISCHARGE

## 2023-03-20 RX ADMIN — AMPICILLIN SODIUM AND SULBACTAM SODIUM 200 GRAM(S): 250; 125 INJECTION, POWDER, FOR SUSPENSION INTRAMUSCULAR; INTRAVENOUS at 05:21

## 2023-03-20 RX ADMIN — PANTOPRAZOLE SODIUM 40 MILLIGRAM(S): 20 TABLET, DELAYED RELEASE ORAL at 06:21

## 2023-03-20 RX ADMIN — AMPICILLIN SODIUM AND SULBACTAM SODIUM 200 GRAM(S): 250; 125 INJECTION, POWDER, FOR SUSPENSION INTRAMUSCULAR; INTRAVENOUS at 11:22

## 2023-03-20 RX ADMIN — Medication 1 TABLET(S): at 11:23

## 2023-03-20 RX ADMIN — Medication 1 APPLICATION(S): at 11:23

## 2023-03-20 RX ADMIN — Medication 1000 UNIT(S): at 11:23

## 2023-03-20 RX ADMIN — Medication 81 MILLIGRAM(S): at 11:22

## 2023-03-20 RX ADMIN — ENOXAPARIN SODIUM 40 MILLIGRAM(S): 100 INJECTION SUBCUTANEOUS at 06:21

## 2023-03-20 RX ADMIN — AMPICILLIN SODIUM AND SULBACTAM SODIUM 200 GRAM(S): 250; 125 INJECTION, POWDER, FOR SUSPENSION INTRAMUSCULAR; INTRAVENOUS at 00:09

## 2023-03-20 NOTE — ADVANCED PRACTICE NURSE CONSULT - ASSESSMENT
This is a 67yr old female patient admitted for Cellulitis, presenting with the following:  -There is evidence of a healed wound to the Mid Spine area  -There is evidence of Bilateral Lower Extremity wounds to which the patient is being followed by Podiatry with a treatment plan in place to address this issue

## 2023-03-20 NOTE — DISCHARGE NOTE NURSING/CASE MANAGEMENT/SOCIAL WORK - NSDCPEFALRISK_GEN_ALL_CORE
For information on Fall & Injury Prevention, visit: [email or web address] OR  [email or web address] OR  https://www.cdc.gov/steadi/patient.html

## 2023-03-20 NOTE — DISCHARGE NOTE NURSING/CASE MANAGEMENT/SOCIAL WORK - PATIENT PORTAL LINK FT
You can access the FollowMyHealth Patient Portal offered by [facility] by registering at the following website: [email or web address]. By joining [employer]’s FollowMyHealth portal, you will also be able to view your health information using other applications (apps) compatible with our system.

## 2023-05-18 NOTE — CONSULT NOTE ADULT - RESPIRATORY
Continued Stay Note  [facility]     Patient Name: Chey Robertson  MRN: [id number]  Today's Date: 5/18/2023    Admit Date: 5/5/2023    Plan: SNF   Discharge Plan     Row Name 05/18/23 1447       Plan    Plan SNF    Patient/Family in Agreement with Plan yes    Plan Comments Patient's Covid antigen is negative.  Her plan is SNF at discharge.  Referral to [facility] and [facility].  CM will continue to follow.    Final Discharge Disposition Code 03 - skilled nursing facility (SNF)               Discharge Codes    No documentation.               Expected Discharge Date and Time     Expected Discharge Date Expected Discharge Time    May 22, 2023             iDana Navarro RN     clear to auscultation bilaterally/no wheezes/no rales/no rhonchi

## 2023-09-29 ENCOUNTER — EMERGENCY (EMERGENCY)
Facility: HOSPITAL | Age: 68
LOS: 1 days | Discharge: HOME | End: 2023-09-29
Attending: EMERGENCY MEDICINE
Payer: MEDICARE

## 2023-09-29 VITALS
WEIGHT: 100.97 LBS | RESPIRATION RATE: 16 BRPM | HEART RATE: 85 BPM | OXYGEN SATURATION: 97 % | DIASTOLIC BLOOD PRESSURE: 77 MMHG | SYSTOLIC BLOOD PRESSURE: 134 MMHG | TEMPERATURE: 98 F | HEIGHT: 68 IN

## 2023-09-29 VITALS
RESPIRATION RATE: 16 BRPM | DIASTOLIC BLOOD PRESSURE: 82 MMHG | OXYGEN SATURATION: 99 % | TEMPERATURE: 99 F | SYSTOLIC BLOOD PRESSURE: 138 MMHG | HEART RATE: 85 BPM

## 2023-09-29 PROCEDURE — 99283 EMERGENCY DEPT VISIT LOW MDM: CPT

## 2023-09-29 PROCEDURE — 99282 EMERGENCY DEPT VISIT SF MDM: CPT

## 2023-09-29 NOTE — ED PROVIDER NOTE - OBJECTIVE STATEMENT
68 yr old female from [facility] with muscular dystrophy walks with walker presents to ed c/o mechanical fall around 330a. pt states she went to bathroom w/o walker and slid down to floor. no head strike, no loc, no fever, no cp, no focal weakness. pt admits frequent falls. pt denies any discomfort. ambulating with walker in ed.

## 2023-09-29 NOTE — ED ADULT TRIAGE NOTE - PAIN: PRESENCE, MLM
[facility] - Intensive Care ([place] [id number])  Palliative Medicine  Progress Note    Patient Name: Cornelio Rivers  MRN: [id number]  Admission Date: 6/15/2023  Hospital Length of Stay: 6 days  Code Status: DNR   Attending Provider: Alexsandra Menendez MD  Consulting Provider: Matthew Castañeda MD  Primary Care Physician: Primary Doctor No  Principal Problem:Decompensated hepatic cirrhosis    Patient information was obtained from spouse/SO and primary team.      Assessment/Plan:     Palliative Care  Palliative care encounter  Palliative Care Encounter / Goals of care discussion:     Narrative:   Cornelio Rivers is a 56 y.o. male patient with ESLD, MELD 35 >> 25, admitted with acute mental status changes in the setting of presumed SBP, LETICIA. Has persistent MS changes despite aggressive management of SBP and HE. Workup for alternative reasons for AMS underway, EEG pending and Neuro consult requested.   Not felt a LTX candidate due to poor social support, ongoing ETOH and severe MS changes.        1: Psychosocial :     - Marital status: , met with jamarcus Martinez [phone]   - Children: 4 grown children live close to the family home in [place]  - Profession: HVAC tech    2: Medicolegal / Advance Care Planning     - Decision making Capacity: does not have capacity   - Advance directive: not on file - conversations between the couple have been had in the past   - Surrogate Decision Maker: wife Michelle    3: Support System:    - Spiritual: yes  - Family: limited but supportive.      4: Prognostication: felt to have poor prognosis with very limited life expectancy    5: Prior Goals of Care discussions: Dr. Menendez had discussed grim prognosis earlier today.     6: Goals of care Decisions / Symptom Management / Recommendations / Plan:     1: Encounter for Palliative Care    - Code Status: Full Code (wife expressed wishes to not undergo life support or resuscitation - DNR decided today)    - Present for discussion: Wife is at the  bedside    - Experience with critical illness: limited, but wife has been through a lot with pt. Past ESLD exacerbations.     - Insight in Disease and Illness trajectory: Ms. Rivers is aware that the pt. Has end stage liver disease and tells me that he can not get a transplant in his condition. She tells me about her conversation with Dr. Menendez earlier today and that she was made aware of a severely limited life expectancy. It came as a shock but not a surprise as she felt he was getting sicker.   She feels overwhelmed and distraught and has not been able to think past this hospitalization.      - Goals of care discussion:   6/21/23   - discussed hospice with patient at length.    - clarified notions that hospice is stopping everything and withdrawing fluid and food (she has a very poor understanding of hospice services).    - expressed that hospice focuses on comfort and well being while accepting the fact that there is a terminal illness that we can not cure.    - outlined the support available, aids, nurses, chaplains and SW while also requiring the family to be the primary caregiver.      - Matt has a neighbor that had her mother in hospice and I encouraged her to reach out to discuss the experience.      - Outside of new findings from EEG it appears that the pt. MS change stems from advanced liver disease. He may be hospice eligible if no new findings present themself.       - we agreed to await final recs from neurology and DR. Menendez to make any further plans for disposition.    - will follow up in AM.       6/20/23   - reviewed findings and expressed concerns that we may not be able to reverse the pt. Persistent and severe AMS.    - outlined that we hope that EEG and neuro eval will give additional treatment options, but also worry that his severe MS impairment may persist due to end stage liver disease - she agrees   - Wife tells me that she knew he was getting sicker over the past months and that  "her  also told her that he knew something was wrong...     - We discussed goals and limitations of care. Michelle tells me that they spoke about "what if" in the past and that her  never wanted to live like a vegetable. He did not want to go on machines.    - We agreed that in this setting DNR would be most appropriate and reflect the pt. Wishes and also prevent potential suffering without benefit.      - We also agreed to await formal evaluation by neurology to then decide the next steps in care. Wife is aware about hospice services and is open to hearing more once all the evaluations are available.     - Goals of Care: await Neurology evaluation.    No machines or CPR - DNR    - Approach to treatment:     - DNR entered in the chart and d/w Dr. Menendez.     2: Symptom Management:     - Pain: does not appear to be in pain  - Dyspnea: no breathless  - Anxiety: no anxiety    3: ESLD     - ETOH related, ongoing abuse  - MELD 35>> 25 driven by bilirubin  - not felt a transplant candidate  - history of variceal bleed and portal HTN (banding in the past_  - ascites s/p paracentesis and empiric therapy for SBP    4: AMS:  - evaluation underway - EEG and Neuro Consult to rule out seizures  - concerns that this is persistent severe HE vs. Other encephalopathy      6: Summary and Recommendation:     - DNR   - ongoing conversations about disposition once specialists eval complete.      - Recommendations were discussed with Dr. menendez    7: Follow up plans:    Will follow    Thank you for your consult. Please call [phone] with questions.                     I will follow-up with patient. Please contact us if you have any additional questions.    Subjective:     Chief Complaint: No chief complaint on file.      HPI:   Cornelio Rivers is a unfortunate 55 yo gentleman with a history of alcoholic cirrhosis of the liver who had a significant episode with GI/variceal bleed, SBP and HE in about 2020. He has been " astinent for some years but had a recent relapse. He has had a slow decline in functional capacity and health overall over the past 2-3 months per wife. He was admitted on 6/3 with acute decline in mental status, coagulopathy and LETICIA (MELD 32). He was admitted to [facility] in [place], treated in the ICU for SBP with shock, LETICIA and encephalopathy.   He is being transferred for evaluation by hepatology and consideration of liver transplant.   Since transfer, the pt. Has had persistent severe encephalopathy. His MELD has improved to ~25 now and paracentesis does not suggest ongoing SBP. Workup for his MS changes have been without clear culprit thus far. EEG is being performed and Neurology is consulted, however, concerns persist that his AMS is result of ESLD. He is not felt a candidate for LTX.     In this setting I am asked to assist with goals of care and ACP discussions.     Discussed with Dr. Menendez at length.       Hospital Course:  No notes on file    Interval History: No significant new changes. Wife thinks he is a bit more awake but still quite encephalopathic.   Neuro recommendations pending completeness of EEG    Past Medical History:   Diagnosis Date    Alcoholic cirrhosis of liver with ascites     Ascites     Coagulopathy     Esophageal varices with bleeding     Hepatic encephalopathy     Mixed hyperlipidemia     Portal hypertension     Thrombocytopenia, unspecified        Past Surgical History:   Procedure Laterality Date    BICEPS TENDON REPAIR Right     femur facture Right     HERNIA REPAIR Bilateral        Review of patient's allergies indicates:  No Known Allergies    Medications:  Continuous Infusions:  Scheduled Meds:   lactulose  30 g Oral Q6H    midodrine  15 mg Oral TID WM    mupirocin   Nasal BID    pantoprazole  40 mg Oral Daily    piperacillin-tazobactam (Zosyn) IV (PEDS and ADULTS) (extended infusion is not appropriate)  4.5 g Intravenous Q8H    rifAXImin   550 mg Oral BID    thiamine (VITAMIN B1) IVPB  500 mg Intravenous TID    vancomycin (VANCOCIN) IVPB  1,000 mg Intravenous Q12H     PRN Meds:acetaminophen, albuterol-ipratropium, dextrose 10%, dextrose 10%, dextrose, dextrose, glucagon (human recombinant), melatonin, naloxone, ondansetron, sodium chloride 0.9%, Pharmacy to dose Vancomycin consult **AND** vancomycin - pharmacy to dose    Family History       Problem Relation (Age of Onset)    Heart disease Father    Hypertension Mother          Tobacco Use    Smoking status: Former     Types: Cigarettes    Smokeless tobacco: Not on file   Substance and Sexual Activity    Alcohol use: Not Currently    Drug use: Never    Sexual activity: Yes       Review of Systems   Unable to perform ROS: Mental status change   Objective:     Vital Signs (Most Recent):  Temp: 98 °F (36.7 °C) (06/21/23 1154)  Pulse: 82 (06/21/23 1436)  Resp: 20 (06/21/23 1154)  BP: 102/64 (06/21/23 1154)  SpO2: 97 % (06/21/23 1154) Vital Signs (24h Range):  Temp:  [97.6 °F (36.4 °C)-98.4 °F (36.9 °C)] 98 °F (36.7 °C)  Pulse:  [] 82  Resp:  [20] 20  SpO2:  [97 %-100 %] 97 %  BP: ()/(54-64) 102/64     Weight: 85.7 kg (188 lb 15 oz)  Body mass index is 27.11 kg/m².       Physical Exam  Constitutional:       Comments: Somnlent, briefly arrousable. Does not track. Chronically ill appearing.    HENT:      Mouth/Throat:      Mouth: Mucous membranes are moist.      Pharynx: No oropharyngeal exudate.   Eyes:      General: Scleral icterus present.   Cardiovascular:      Rate and Rhythm: Normal rate.      Heart sounds: No murmur heard.  Pulmonary:      Effort: No respiratory distress.      Breath sounds: No wheezing.   Abdominal:      General: There is distension.      Tenderness: There is no abdominal tenderness. There is no guarding.   Musculoskeletal:         General: No swelling or deformity.      Cervical back: No rigidity or tenderness.   Skin:     General: Skin is warm.      Coloration:  Skin is jaundiced.   Neurological:      General: No focal deficit present.      Motor: Weakness present.      Comments: Asterixis present. Can not hold head up, opens eyes briefly but drifts to sleep. Can not verbalize.           Review of Symptoms      Symptom Assessment (ESAS 0-10 Scale)  Pain:  0  Dyspnea:  0  Anxiety:  0  Nausea:  0  Depression:  0  Anorexia:  0  Fatigue:  0  Insomnia:  0  Restlessness:  0  Agitation:  0 due to Mental status change         Pain Assessment in Advanced Demential Scale (PAINAD)   Breathing - Independent of vocalization:  0  Negative vocalization:  0  Facial expression:  0  Body language:  0  Consolability:  0  Total:  0    Living Arrangements:  Lives with spouse    Psychosocial/Cultural:   See Palliative Psychosocial Note: No  Social Issues Identified: Coping deficit pt/family  Bereavement Risk: No  Caregiver Needs Discussed. Caregiver Distress: Yes: Intensity of family caregiving  Cultural: none  **Primary  to Follow**  Palliative Care  Consult: No    Spiritual:  F - Ramya and Belief:  Yes  I - Importance:  Yes  C - Community:  Yes  A - Address in Care:  Yes     Time-Based Charting:  No      Advance Care Planning   Advance Directives:   Living Will: No    LaPOST: No    Do Not Resuscitate Status: No    Medical Power of : No      Decision Making:  Family answered questions  Goals of Care: What is most important right now is to focus on comfort and QOL . Accordingly, we have decided that the best plan to meet the patient's goals includes continuing with treatment.       Significant Labs: CBC:   Recent Labs   Lab 06/20/23  0521   WBC 9.14   HGB 7.4*   HCT 22.1*   *       CMP:   Recent Labs   Lab 06/20/23  0521 06/21/23  0424   * 125*   K 3.6 3.4*   CL 87* 84*   CO2 30* 32*   * 104   BUN 8 7   CREATININE 0.7 0.7   CALCIUM 8.7 8.3*   PROT 5.6* 5.4*   ALBUMIN 2.6* 2.4*   BILITOT 13.3* 12.9*   ALKPHOS 150* 153*   AST 88* 87*   ALT 63*  59*   ANIONGAP 10 9       Coagulation:   Recent Labs   Lab 06/21/23 0424   INR 2.2*       CBC:   Recent Labs   Lab 06/20/23 0521   WBC 9.14   HGB 7.4*   HCT 22.1*   *   *       BMP:  Recent Labs   Lab 06/21/23 0424      *   K 3.4*   CL 84*   CO2 32*   BUN 7   CREATININE 0.7   CALCIUM 8.3*       LFT:  Lab Results   Component Value Date    AST 87 (H) 06/21/2023    ALKPHOS 153 (H) 06/21/2023    BILITOT 12.9 (H) 06/21/2023     Albumin:   Albumin   Date Value Ref Range Status   06/21/2023 2.4 (L) 3.5 - 5.2 g/dL Final     Protein:   Total Protein   Date Value Ref Range Status   06/21/2023 5.4 (L) 6.0 - 8.4 g/dL Final     Lactic acid:   No results found for: LACTATE    Significant Imaging: CXR: I have reviewed all pertinent results/findings within the past 24 hours:           Matthew Castañeda MD  Palliative Medicine  [facility] - Intensive Care ([place] [id number])               complains of pain/discomfort

## 2023-09-29 NOTE — ED PROVIDER NOTE - CLINICAL SUMMARY MEDICAL DECISION MAKING FREE TEXT BOX
68 yr old female from [facility] with muscular dystrophy walks with walker presents to ed c/o mechanical fall around 330a. pt states she went to bathroom w/o walker and slid down to floor. no head strike, no loc, no fever, no cp, no focal weakness. pt admits frequent falls. pt denies any discomfort. ambulating with walker in ed.    no acute injury. pt neuro intact. return to [facility]

## 2023-09-29 NOTE — ED PROVIDER NOTE - PATIENT PORTAL LINK FT
You can access the FollowMyHealth Patient Portal offered by [facility] by registering at the following website: [email or web address]. By joining [facility]’s FollowMyHealth portal, you will also be able to view your health information using other applications (apps) compatible with our system.

## 2023-09-29 NOTE — ED ADULT NURSE NOTE - CHIEF COMPLAINT QUOTE
MTM Chart Review    MD dictation noted:   1. Increase abemaciclib dose from 50 mg twice daily up to 100 mg twice daily      New RX sent to pharmacy to reflect updated sig, with MD to cosign. Pharmacy will continue to follow.       Darlene España RPH  8/26/2022  11:18 EDT        c/o pain in her buttocks as she slide down to the floor as per pt stated

## 2023-09-29 NOTE — ED ADULT NURSE REASSESSMENT NOTE - NS ED NURSE REASSESS COMMENT FT1
720 am pt resting now , no distress . discharged .  waiting  for ambulance  . report given to day shift rn

## 2023-09-29 NOTE — ED ADULT NURSE NOTE - OBJECTIVE STATEMENT
the  patient  is a 68 y female complaining that she fell while walking , hitting her buttock in the floor . she wants to check  anything  happened to her bones . she denies LOC , also denies pain

## 2024-02-13 ENCOUNTER — INPATIENT (INPATIENT)
Facility: HOSPITAL | Age: 69
LOS: 2 days | Discharge: NURSING, REHAB OR LONG-TERM CARE | DRG: 551 | End: 2024-02-16
Attending: INTERNAL MEDICINE | Admitting: INTERNAL MEDICINE
Payer: MEDICARE

## 2024-02-13 VITALS
HEIGHT: 70 IN | RESPIRATION RATE: 16 BRPM | TEMPERATURE: 98 F | DIASTOLIC BLOOD PRESSURE: 98 MMHG | HEART RATE: 80 BPM | SYSTOLIC BLOOD PRESSURE: 148 MMHG | WEIGHT: 100.97 LBS | OXYGEN SATURATION: 97 %

## 2024-02-13 PROCEDURE — 99285 EMERGENCY DEPT VISIT HI MDM: CPT

## 2024-02-13 PROCEDURE — 73030 X-RAY EXAM OF SHOULDER: CPT | Mod: 26,RT

## 2024-02-13 PROCEDURE — 73060 X-RAY EXAM OF HUMERUS: CPT | Mod: 26,RT

## 2024-02-13 RX ORDER — ACETAMINOPHEN 500 MG
650 TABLET ORAL ONCE
Refills: 0 | Status: COMPLETED | OUTPATIENT
Start: 2024-02-13 | End: 2024-02-13

## 2024-02-13 RX ADMIN — Medication 650 MILLIGRAM(S): at 23:06

## 2024-02-13 RX ADMIN — Medication 650 MILLIGRAM(S): at 22:35

## 2024-02-13 NOTE — ED PROVIDER NOTE - PROGRESS NOTE DETAILS
Pt admitted to Dr. Molina's service for pain management, rehab.  Pt requesting additional analgesia.  MAR endorsed. Pt agrees with admission.   I had a detailed discussion with the patient and/or guardian regarding the historical points, exam findings, and any diagnostic results supporting the admit diagnosis. Rashel HILL: Received sign out from Dr. Lazarus.  CT reported Acute L2 vertebral wedge compression fracture. No bony retropulsion.  Fat stranding in the right posterior chest wall extending into the flank,   likely ecchymosis.  Evaluation for solid organ injury is limited in the absence of intravenous contrast.

## 2024-02-13 NOTE — ED PROVIDER NOTE - OBJECTIVE STATEMENT
68 F presents from NH for arm pain s/p fall. 68 F presents from NH for arm pain s/p fall. Patient reports she has fallen 2x in the last 3 days. 68 F presents from NH for arm pain s/p fall. Patient reports she has fallen 2x in the last 3 days. Reports she has been progressively weak. 3 days prior to arrival had a mechanical fall that she states she did not seek care for but noticed bruising to flank. Today prior to arrival had another mechanical fall causing cut to arm and arm pain. Denies head trauma or SOB.    GENERAL APPEARANCE:  AxOx4, Elderly, Frail appearing, Severe Kyphosis.  HEENT:  NC, AT. MMM. EOMI, clear conjunctiva, oropharynx clear.  NECK:  Supple without lymphadenopathy.  No stiffness or restricted ROM.  HEART:  Normal rate and regular rhythm, normal S1/S1, no m/r/g  LUNGS:  CTAB, moving air well. No crackles or wheezes are heard.  ABDOMEN:  Soft, nontender, nondistended with good bowel sounds heard.  BACK: No CVAT, no obvious deformity. R flank ecchymosis.  EXTREMITIES:  Without cyanosis, clubbing or edema.  NEUROLOGICAL:  Grossly nonfocal. Alert and oriented, moving all 4 extremities. CN II-XII grossly intact. Observed to ambulate with normal gait.  Skin:  Warm and dry without any rash. Abrasions to right forearm.

## 2024-02-13 NOTE — ED ADULT TRIAGE NOTE - CHIEF COMPLAINT QUOTE
patient sent from [facility] s/p fall as per patient she was bending and fell complaining of R arm pain no head injury

## 2024-02-13 NOTE — ED PROVIDER NOTE - CLINICAL SUMMARY MEDICAL DECISION MAKING FREE TEXT BOX
After introducing myself to the patient and/or family I have performed a medical history, review of systems, and physical examination. I have formulated a differential diagnosis and plan of care for this visit and discussed this with the patient and/or family. I have also addressed the risks and benefits of diagnostic and treatment modalities planned for this visit.  Vital Signs: Reviewed the patient's vital signs  Nursing Notes: Reviewed and utilized the nursing notes  Old Medical Records: The patient's available past medical records and past encounters were reviewed  Laboratory Studies: Ordered and independently interpreted laboratory test, see above  Imaging Studies: Imaging studies ordered. Radiologist interpretation reviewed. I have independently reviewed imaging.    68 F with h/o OA presents after mechanical fall reporting two separate falls recently.  Patient appears frail, concern for rib fracture vs wrist fracture. Imaging obtained and pain medication given.  CT head neck chest abd pelvis obtained, patient pending results of imaging at time of sign out to day team.

## 2024-02-14 DIAGNOSIS — M54.50 LOW BACK PAIN, UNSPECIFIED: ICD-10-CM

## 2024-02-14 DIAGNOSIS — M81.0 AGE-RELATED OSTEOPOROSIS WITHOUT CURRENT PATHOLOGICAL FRACTURE: ICD-10-CM

## 2024-02-14 DIAGNOSIS — S32.000A WEDGE COMPRESSION FRACTURE OF UNSPECIFIED LUMBAR VERTEBRA, INITIAL ENCOUNTER FOR CLOSED FRACTURE: ICD-10-CM

## 2024-02-14 DIAGNOSIS — S32.009A UNSPECIFIED FRACTURE OF UNSPECIFIED LUMBAR VERTEBRA, INITIAL ENCOUNTER FOR CLOSED FRACTURE: ICD-10-CM

## 2024-02-14 DIAGNOSIS — K21.9 GASTRO-ESOPHAGEAL REFLUX DISEASE WITHOUT ESOPHAGITIS: ICD-10-CM

## 2024-02-14 DIAGNOSIS — I73.9 PERIPHERAL VASCULAR DISEASE, UNSPECIFIED: ICD-10-CM

## 2024-02-14 DIAGNOSIS — S81.801A UNSPECIFIED OPEN WOUND, RIGHT LOWER LEG, INITIAL ENCOUNTER: ICD-10-CM

## 2024-02-14 DIAGNOSIS — Z29.9 ENCOUNTER FOR PROPHYLACTIC MEASURES, UNSPECIFIED: ICD-10-CM

## 2024-02-14 LAB
ANION GAP SERPL CALC-SCNC: 4 MMOL/L — LOW (ref 5–17)
BLD GP AB SCN SERPL QL: SIGNIFICANT CHANGE UP
BUN SERPL-MCNC: 18 MG/DL — SIGNIFICANT CHANGE UP (ref 7–18)
CALCIUM SERPL-MCNC: 8.6 MG/DL — SIGNIFICANT CHANGE UP (ref 8.4–10.5)
CHLORIDE SERPL-SCNC: 110 MMOL/L — HIGH (ref 96–108)
CO2 SERPL-SCNC: 29 MMOL/L — SIGNIFICANT CHANGE UP (ref 22–31)
CREAT SERPL-MCNC: 0.54 MG/DL — SIGNIFICANT CHANGE UP (ref 0.5–1.3)
EGFR: 99 ML/MIN/1.73M2 — SIGNIFICANT CHANGE UP
GLUCOSE SERPL-MCNC: 102 MG/DL — HIGH (ref 70–99)
HCT VFR BLD CALC: 33.7 % — LOW (ref 34.5–45)
HGB BLD-MCNC: 10.6 G/DL — LOW (ref 11.5–15.5)
MCHC RBC-ENTMCNC: 29.9 PG — SIGNIFICANT CHANGE UP (ref 27–34)
MCHC RBC-ENTMCNC: 31.5 GM/DL — LOW (ref 32–36)
MCV RBC AUTO: 95.2 FL — SIGNIFICANT CHANGE UP (ref 80–100)
NRBC # BLD: 0 /100 WBCS — SIGNIFICANT CHANGE UP (ref 0–0)
PLATELET # BLD AUTO: 134 K/UL — LOW (ref 150–400)
POTASSIUM SERPL-MCNC: 3.8 MMOL/L — SIGNIFICANT CHANGE UP (ref 3.5–5.3)
POTASSIUM SERPL-SCNC: 3.8 MMOL/L — SIGNIFICANT CHANGE UP (ref 3.5–5.3)
RBC # BLD: 3.54 M/UL — LOW (ref 3.8–5.2)
RBC # FLD: 12.9 % — SIGNIFICANT CHANGE UP (ref 10.3–14.5)
SODIUM SERPL-SCNC: 143 MMOL/L — SIGNIFICANT CHANGE UP (ref 135–145)
WBC # BLD: 7.16 K/UL — SIGNIFICANT CHANGE UP (ref 3.8–10.5)
WBC # FLD AUTO: 7.16 K/UL — SIGNIFICANT CHANGE UP (ref 3.8–10.5)

## 2024-02-14 PROCEDURE — 74176 CT ABD & PELVIS W/O CONTRAST: CPT | Mod: 26,MA

## 2024-02-14 PROCEDURE — 71250 CT THORAX DX C-: CPT | Mod: 26,MA

## 2024-02-14 PROCEDURE — 99222 1ST HOSP IP/OBS MODERATE 55: CPT

## 2024-02-14 RX ORDER — CHOLECALCIFEROL (VITAMIN D3) 125 MCG
1 CAPSULE ORAL
Qty: 0 | Refills: 0 | DISCHARGE

## 2024-02-14 RX ORDER — LIDOCAINE 4 G/100G
1 CREAM TOPICAL DAILY
Refills: 0 | Status: DISCONTINUED | OUTPATIENT
Start: 2024-02-14 | End: 2024-02-16

## 2024-02-14 RX ORDER — SENNA PLUS 8.6 MG/1
2 TABLET ORAL AT BEDTIME
Refills: 0 | Status: DISCONTINUED | OUTPATIENT
Start: 2024-02-14 | End: 2024-02-16

## 2024-02-14 RX ORDER — ENOXAPARIN SODIUM 100 MG/ML
40 INJECTION SUBCUTANEOUS EVERY 24 HOURS
Refills: 0 | Status: DISCONTINUED | OUTPATIENT
Start: 2024-02-14 | End: 2024-02-16

## 2024-02-14 RX ORDER — ASPIRIN/CALCIUM CARB/MAGNESIUM 324 MG
81 TABLET ORAL DAILY
Refills: 0 | Status: DISCONTINUED | OUTPATIENT
Start: 2024-02-14 | End: 2024-02-16

## 2024-02-14 RX ORDER — HALOBETASOL PROPIONATE 0.5 MG/G
1 OINTMENT TOPICAL
Qty: 0 | Refills: 0 | DISCHARGE

## 2024-02-14 RX ORDER — PANTOPRAZOLE SODIUM 20 MG/1
40 TABLET, DELAYED RELEASE ORAL
Refills: 0 | Status: DISCONTINUED | OUTPATIENT
Start: 2024-02-14 | End: 2024-02-16

## 2024-02-14 RX ORDER — ACETAMINOPHEN 500 MG
2 TABLET ORAL
Qty: 0 | Refills: 0 | DISCHARGE

## 2024-02-14 RX ORDER — ASPIRIN/CALCIUM CARB/MAGNESIUM 324 MG
1 TABLET ORAL
Qty: 0 | Refills: 0 | DISCHARGE

## 2024-02-14 RX ORDER — PANTOPRAZOLE SODIUM 20 MG/1
1 TABLET, DELAYED RELEASE ORAL
Qty: 0 | Refills: 0 | DISCHARGE

## 2024-02-14 RX ORDER — NALOXONE HYDROCHLORIDE 4 MG/.1ML
0.4 SPRAY NASAL ONCE
Refills: 0 | Status: DISCONTINUED | OUTPATIENT
Start: 2024-02-14 | End: 2024-02-16

## 2024-02-14 RX ORDER — CHOLECALCIFEROL (VITAMIN D3) 125 MCG
1000 CAPSULE ORAL DAILY
Refills: 0 | Status: DISCONTINUED | OUTPATIENT
Start: 2024-02-14 | End: 2024-02-16

## 2024-02-14 RX ORDER — OXYCODONE HYDROCHLORIDE 5 MG/1
2.5 TABLET ORAL EVERY 4 HOURS
Refills: 0 | Status: DISCONTINUED | OUTPATIENT
Start: 2024-02-14 | End: 2024-02-14

## 2024-02-14 RX ORDER — INFLUENZA VIRUS VACCINE 15; 15; 15; 15 UG/.5ML; UG/.5ML; UG/.5ML; UG/.5ML
0.7 SUSPENSION INTRAMUSCULAR ONCE
Refills: 0 | Status: DISCONTINUED | OUTPATIENT
Start: 2024-02-14 | End: 2024-02-16

## 2024-02-14 RX ORDER — OXYCODONE HYDROCHLORIDE 5 MG/1
5 TABLET ORAL EVERY 4 HOURS
Refills: 0 | Status: DISCONTINUED | OUTPATIENT
Start: 2024-02-14 | End: 2024-02-16

## 2024-02-14 RX ORDER — MUPIROCIN 20 MG/G
1 OINTMENT TOPICAL
Refills: 0 | Status: DISCONTINUED | OUTPATIENT
Start: 2024-02-14 | End: 2024-02-16

## 2024-02-14 RX ORDER — POLYETHYLENE GLYCOL 3350 17 G/17G
17 POWDER, FOR SOLUTION ORAL DAILY
Refills: 0 | Status: DISCONTINUED | OUTPATIENT
Start: 2024-02-14 | End: 2024-02-16

## 2024-02-14 RX ORDER — SOD,AMMONIUM,POTASSIUM LACTATE
1 CREAM (GRAM) TOPICAL
Refills: 0 | Status: DISCONTINUED | OUTPATIENT
Start: 2024-02-14 | End: 2024-02-16

## 2024-02-14 RX ORDER — ACETAMINOPHEN 500 MG
1000 TABLET ORAL EVERY 8 HOURS
Refills: 0 | Status: DISCONTINUED | OUTPATIENT
Start: 2024-02-14 | End: 2024-02-16

## 2024-02-14 RX ORDER — SOD,AMMONIUM,POTASSIUM LACTATE
1 CREAM (GRAM) TOPICAL
Refills: 0 | DISCHARGE

## 2024-02-14 RX ORDER — HALOBETASOL PROPIONATE 0.5 MG/G
1 OINTMENT TOPICAL
Refills: 0 | DISCHARGE

## 2024-02-14 RX ADMIN — Medication 1 APPLICATION(S): at 20:34

## 2024-02-14 RX ADMIN — Medication 1000 UNIT(S): at 12:21

## 2024-02-14 RX ADMIN — Medication 1000 MILLIGRAM(S): at 23:42

## 2024-02-14 RX ADMIN — SENNA PLUS 2 TABLET(S): 8.6 TABLET ORAL at 23:12

## 2024-02-14 RX ADMIN — PANTOPRAZOLE SODIUM 40 MILLIGRAM(S): 20 TABLET, DELAYED RELEASE ORAL at 06:50

## 2024-02-14 RX ADMIN — ENOXAPARIN SODIUM 40 MILLIGRAM(S): 100 INJECTION SUBCUTANEOUS at 06:50

## 2024-02-14 RX ADMIN — LIDOCAINE 1 PATCH: 4 CREAM TOPICAL at 12:20

## 2024-02-14 RX ADMIN — Medication 81 MILLIGRAM(S): at 12:21

## 2024-02-14 RX ADMIN — Medication 1000 MILLIGRAM(S): at 23:12

## 2024-02-14 RX ADMIN — Medication 1000 MILLIGRAM(S): at 13:22

## 2024-02-14 RX ADMIN — MUPIROCIN 1 APPLICATION(S): 20 OINTMENT TOPICAL at 06:51

## 2024-02-14 RX ADMIN — POLYETHYLENE GLYCOL 3350 17 GRAM(S): 17 POWDER, FOR SOLUTION ORAL at 12:22

## 2024-02-14 RX ADMIN — LIDOCAINE 1 PATCH: 4 CREAM TOPICAL at 19:18

## 2024-02-14 RX ADMIN — OXYCODONE HYDROCHLORIDE 5 MILLIGRAM(S): 5 TABLET ORAL at 14:18

## 2024-02-14 RX ADMIN — Medication 1 APPLICATION(S): at 06:51

## 2024-02-14 RX ADMIN — OXYCODONE HYDROCHLORIDE 5 MILLIGRAM(S): 5 TABLET ORAL at 13:45

## 2024-02-14 RX ADMIN — Medication 1000 MILLIGRAM(S): at 13:44

## 2024-02-14 RX ADMIN — MUPIROCIN 1 APPLICATION(S): 20 OINTMENT TOPICAL at 20:34

## 2024-02-14 NOTE — H&P ADULT - NSHPREVIEWOFSYSTEMS_GEN_ALL_CORE
REVIEW OF SYSTEMS:    CONSTITUTIONAL: No weakness, fevers or chills  EYES/ENT: No visual changes;  No vertigo or throat pain   NECK: No pain or stiffness  RESPIRATORY: No cough, wheezing, hemoptysis; No shortness of breath  CARDIOVASCULAR: No chest pain or palpitations  GASTROINTESTINAL: No abdominal or epigastric pain. No nausea, vomiting, or hematemesis; No diarrhea or constipation. No melena or hematochezia.  GENITOURINARY: No dysuria, frequency or hematuria  NEUROLOGICAL: No numbness or weakness (+) lower back pain   SKIN: (+) healed wound to RLE   All other review of systems is negative unless indicated above.

## 2024-02-14 NOTE — H&P ADULT - ASSESSMENT
Pt is a 68F from [facility] ambulates with a walker w/ PMHx of ?muscular dystrophy, schizophrenia, GERD, PAD, anxiety, presenting w/ RUE pain and lower back pain s/p mechanical fall at the nursing home without LOC or head strike. Vitals wnl. Labs wnl. CT Ab/P/C showed acute L2 vertebral wedge compression fracture with an area of ecchymosis. Right shoulder and humerus Xrays show no fractures/gross abnormalities on wet read. Admitted for lumbar compression fracture.

## 2024-02-14 NOTE — ED ADULT NURSE NOTE - NSFALLRISKINTERV_ED_ALL_ED

## 2024-02-14 NOTE — H&P ADULT - NSHPPHYSICALEXAM_GEN_ALL_CORE
T(C): 36.4 (02-14-24 @ 03:32), Max: 36.9 (02-13-24 @ 20:48)  HR: 68 (02-14-24 @ 03:32) (68 - 80)  BP: 150/81 (02-14-24 @ 03:32) (139/77 - 150/81)  RR: 16 (02-14-24 @ 03:32) (16 - 16)  SpO2: 96% (02-14-24 @ 03:32) (96% - 97%)    CONSTITUTIONAL: Well groomed, no apparent distress  EYES: PERRLA and symmetric, EOMI, No conjunctival or scleral injection, non-icteric  ENMT: Oral mucosa with moist membranes. Normal dentition; no pharyngeal injection or exudates             NECK: Supple, symmetric and without tracheal deviation   RESP: No respiratory distress, no use of accessory muscles; CTA b/l, no WRR  CV: RRR, +S1S2, no MRG; no JVD; no peripheral edema  GI: Soft, NT, ND, no rebound, no guarding; no palpable masses; no hepatosplenomegaly; no hernia palpated  LYMPH: No cervical LAD or tenderness; no axillary LAD or tenderness; no inguinal LAD or tenderness  MSK: Normal gait; No digital clubbing or cyanosis; examination of the (head/neck/spine/ribs/pelvis, RUE, LUE, RLE, LLE) without misalignment,            Normal ROM without pain, no spinal tenderness, normal muscle strength/tone  SKIN: No rashes or ulcers noted; no subcutaneous nodules or induration palpable  NEURO: CN II-XII intact; normal reflexes in upper and lower extremities, sensation intact in upper and lower extremities b/l to light touch   PSYCH: Appropriate insight/judgment; A+O x 3, mood and affect appropriate, recent/remote memory intact T(C): 36.4 (02-14-24 @ 03:32), Max: 36.9 (02-13-24 @ 20:48)  HR: 68 (02-14-24 @ 03:32) (68 - 80)  BP: 150/81 (02-14-24 @ 03:32) (139/77 - 150/81)  RR: 16 (02-14-24 @ 03:32) (16 - 16)  SpO2: 96% (02-14-24 @ 03:32) (96% - 97%)    CONSTITUTIONAL: Well groomed, no apparent distress, thin appearing elderly female   EYES: PERRLA and symmetric, EOMI, No conjunctival or scleral injection, non-icteric  ENMT: Oral mucosa with dry mucous membranes.   RESP: No respiratory distress, no use of accessory muscles; CTA b/l, no WRR  CV: RRR, +S1S2, no MRG; no JVD; no peripheral edema  GI: Soft, NT, ND, no rebound, no guarding; no palpable masses; no hepatosplenomegaly; no hernia palpated  MSK:  Normal ROM without pain, no spinal tenderness, normal muscle strength/tone (+) kyphoscoliosis noted, multiple abrasions to the back as well as an abrasion on the right forearm tenderness to palpation in the lumbar spinal region   SKIN: No rashes or ulcers noted; no subcutaneous nodules or induration palpable  NEURO: CN II-XII intact; sensation intact in upper and lower extremities b/l to light touch, no focal neuro deficits   PSYCH: Appropriate insight/judgment; A+O x 3, mood and affect appropriate, recent/remote memory intact

## 2024-02-14 NOTE — ED ADULT NURSE NOTE - CHIEF COMPLAINT QUOTE
patient sent from [facility] s/p fall as per patient she was bending and fell complaining of R arm pain no head injury

## 2024-02-14 NOTE — H&P ADULT - HISTORY OF PRESENT ILLNESS
Pt is a 68F from [facility] ambulates with a walker w/ PMHx of ?muscular dystrophy, schizophrenia, GERD, PAD, anxiety, presenting w/ RUE pain and lower back pain s/p mechanical fall at the nursing home. Pt endorses that she has 2 falls over the weekend, once on Saturday and once on Tuesday. She stated that both times she landed on her back, and on Tuesday she also landed on her arm. No LOC and no head strike, not on AC. Denies numbness/ paresthesias in b/l LE, denies urinary or bowel incontinence. Denies dizziness, headaches, chest pain, fevers, chills, cough, sob, abdominal pain, n/v/d/constipation.     Last [facility] admission 3/2023- treated for RLE cellulitis w/ unasyn

## 2024-02-14 NOTE — ED ADULT NURSE NOTE - OBJECTIVE STATEMENT
brought in due to fall with bruise to right lower back , swelling abrasiion right elbow, states lost balance while throwing something.

## 2024-02-14 NOTE — H&P ADULT - ATTENDING COMMENTS
Pt is a 68F from [facility] ambulates with a walker w/ PMHx of ?muscular dystrophy, schizophrenia, GERD, PAD, anxiety, presenting w/ RUE pain and lower back pain s/p mechanical fall at the nursing home. Pt endorses that she has 2 falls over the weekend, once on Saturday and once on Tuesday. She stated that both times she landed on her back, and on Tuesday she also landed on her arm. No LOC and no head strike, not on AC. Denies numbness/ paresthesias in b/l LE, denies urinary or bowel incontinence. Denies dizziness, headaches, chest pain, fevers, chills, cough, sob, abdominal pain, n/v/d/constipation.     Last [facility] admission 3/2023- treated for RLE cellulitis w/ unasyn         # S/P MECHANICAL FALL  # ACUTE L2 COMPRESSION FRACTURE  # RIGHT ARM INJURY  - NOTED CT CHEST/ABD/PELVIS  - NOTED XRAY RIGHT SHOULDER AND HUMERUS  - PRN PAIN CONTROL  - F/U PT EVAL    # SEVERE PROTEIN CALORIE MALNUTRITION  - NUTRITIONAL SUPPLEMENT    # AMBULATORY DYSFUNCTION S/T KYPHOSIS, PERIPHERAL NEUROPATHY, VENOUS INSUFFICIENCY  - F/U PT EVAL    # SCHIZOPHRENIA, ANXIETY    # GERD    # PAD    # GI AND DVT PPX

## 2024-02-14 NOTE — CONSULT NOTE ADULT - PROBLEM SELECTOR RECOMMENDATION 9
Pt with acute lumbar back pain which is somatic in nature due to L2 wedge compression fracture.  High risk medications reviewed. Avoid polypharmacy. Avoid IV opioids. Avoid NSAIDs and benzodiazepines. Non-pharmacological sleep aides initiated. Non-opioid medications and non-pharmacological pain management measures initiated.   Opioid pain recommendations   -  Oxycodone 5 mg PO q 4 hours PRN severe pain. Monitor for sedation/ respiratory depression.   Non-opioid pain recommendations   - Acetaminophen 1 gram PO q 8 hours x 3 days.   - Lidoderm 4% patch daily.   Bowel Regimen  - Continue Miralax 17G PO daily  - Continue Senna 2 tablets at bedtime for constipation  - Continue dulcolax 5mg PO daily PRN constipation  Mild pain (score 1-3)  - Non-pharmacological pain treatment recommendations  - Warm/ Cool packs PRN   - Repositioning extremity, elevation, imagery, relaxation, distraction.  - Physical therapy OOB if no contraindications   Recommendations discussed with primary team and RN

## 2024-02-14 NOTE — H&P ADULT - BIRTH SEX
Patient called stated she don't insurance for the month of January. Her insurance will start in Feb.     Patient called requesting refill. Med pending. Pharmacy verified and set up.     Please call patient with questions regarding this request.     Patient advised of 24-48 hours to process the request.     Thank you.       Female

## 2024-02-14 NOTE — ED ADULT NURSE NOTE - NSFALLRISKASMTTYPE_ED_ALL_ED
Admitted to [facility] from 3/23/23-3/29/23 secondary to RLL Segmental PE without heart strain as well as Strep Pneumonia bacteremia and was discharged on a 4 week course of Rocephin with an end date of 4/20/23.    Plan:  Continue Rocephin 2 grams daily during admission  ID consult in AM  PICC line dressing was open on admssion, will repeat blood cultures     Initial (On Arrival)

## 2024-02-14 NOTE — CONSULT NOTE ADULT - SUBJECTIVE AND OBJECTIVE BOX
Source of information: SAVANNAH YAÑEZ, Chart review  Patient language: English  : n/a    HPI:  Pt is a 68F from [facility] ambulates with a walker w/ PMHx of ?muscular dystrophy, schizophrenia, GERD, PAD, anxiety, presenting w/ RUE pain and lower back pain s/p mechanical fall at the nursing home. Pt endorses that she has 2 falls over the weekend, once on Saturday and once on Tuesday. She stated that both times she landed on her back, and on Tuesday she also landed on her arm. No LOC and no head strike, not on AC. Denies numbness/ paresthesias in b/l LE, denies urinary or bowel incontinence. Denies dizziness, headaches, chest pain, fevers, chills, cough, sob, abdominal pain, n/v/d/constipation.     Last [facility] admission 3/2023- treated for RLE cellulitis w/ unasyn  (14 Feb 2024 04:30)    Pt is admitted for  L2 wedge compression fracture s/p fall. Pain consulted 2/14 for back pain. Pt seen and examined at bedside. Reports lumbar back pain score 8-9/10 SCALE USED: (1-10 VNRS). Pt describes pain as constant, aching, greatest over right lumbar back, radiating to left lumbar back, does not radiate down legs, alleviated by pain medication, exacerbated by movement. Pt tolerating PO diet. Denies lethargy, chest pain, SOB, nausea, vomiting, constipation. Reports last  2/14. Patient stated goal for pain control: to be able to take deep breaths, get out of bed to chair and ambulate with tolerable pain control. Reports ambulating with walker at baseline. Pt denies taking medications for pain at home.     PAST MEDICAL & SURGICAL HISTORY:  GERD (gastroesophageal reflux disease)      H/O: depression      Osteoporosis      H/O personality disorder      No significant past surgical history          FAMILY HISTORY:      Social History:   [X ] Denies ETOH use, illicit drug use and smoking    Allergies    No Known Allergies    MEDICATIONS  (STANDING):  acetaminophen     Tablet .. 1000 milliGRAM(s) Oral every 8 hours  ammonium lactate 12% Lotion 1 Application(s) Topical two times a day  aspirin enteric coated 81 milliGRAM(s) Oral daily  cholecalciferol 1000 Unit(s) Oral daily  enoxaparin Injectable 40 milliGRAM(s) SubCutaneous every 24 hours  lidocaine   4% Patch 1 Patch Transdermal daily  mupirocin 2% Ointment 1 Application(s) Topical two times a day  naloxone Injectable 0.4 milliGRAM(s) IV Push once  pantoprazole    Tablet 40 milliGRAM(s) Oral before breakfast  polyethylene glycol 3350 17 Gram(s) Oral daily  senna 2 Tablet(s) Oral at bedtime    MEDICATIONS  (PRN):  bisacodyl 5 milliGRAM(s) Oral daily PRN Constipation  oxyCODONE    IR 5 milliGRAM(s) Oral every 4 hours PRN Severe Pain (7 - 10)      Vital Signs Last 24 Hrs  T(C): 37.2 (14 Feb 2024 11:16), Max: 37.2 (14 Feb 2024 11:16)  T(F): 98.9 (14 Feb 2024 11:16), Max: 98.9 (14 Feb 2024 11:16)  HR: 70 (14 Feb 2024 11:16) (64 - 80)  BP: 160/82 (14 Feb 2024 11:16) (128/77 - 160/82)  BP(mean): 94 (14 Feb 2024 04:42) (94 - 94)  RR: 17 (14 Feb 2024 11:16) (16 - 17)  SpO2: 99% (14 Feb 2024 11:16) (96% - 99%)    Parameters below as of 14 Feb 2024 11:16  Patient On (Oxygen Delivery Method): room air        LABS: Reviewed.                          10.6   7.16  )-----------( 134      ( 14 Feb 2024 00:43 )             33.7     02-14    143  |  110<H>  |  18  ----------------------------<  102<H>  3.8   |  29  |  0.54    Ca    8.6      14 Feb 2024 00:43          Urinalysis Basic - ( 14 Feb 2024 00:43 )    Color: x / Appearance: x / SG: x / pH: x  Gluc: 102 mg/dL / Ketone: x  / Bili: x / Urobili: x   Blood: x / Protein: x / Nitrite: x   Leuk Esterase: x / RBC: x / WBC x   Sq Epi: x / Non Sq Epi: x / Bacteria: x    Radiology: Reviewed.   < from: CT Abdomen and Pelvis No Cont (02.14.24 @ 01:02) >    ACC: [id number] EXAM:  CT ABDOMEN AND PELVIS   ORDERED BY: MAX LAZARUS     ACC: [id number] EXAM:  CT CHEST   ORDERED BY: MAX LAZARUS     PROCEDURE DATE:  02/14/2024          INTERPRETATION:  CLINICAL INFORMATION: Status post fall, flank ecchymosis    COMPARISON: CT lumbar spine 7/13/2022.    CONTRAST/COMPLICATIONS:  IV Contrast: None  Oral Contrast: None  Complications: None    PROCEDURE:  CT of the Chest, Abdomen and Pelvis was performed.  Sagittal and coronal reformats were performed.    FINDINGS:  CHEST:  LUNGS AND LARGE AIRWAYS: Patent central airways. Right lower lobe distal   mucoid impactions and scattered tree-in-bud nodules. Biapical pleural   parenchymal fibrosis and traction bronchiectasis. Additional areas of   pleural thickening bilaterally, including linear bibasilar scarring   versus subsegmental atelectasis. Few additional scattered nonspecific   subcentimeter nodules are seen bilaterally.  PLEURA: No pleural effusion.  VESSELS: Aortic and coronary artery calcifications.  HEART: Heart size is normal. No pericardial effusion.  MEDIASTINUM AND CATHY: No lymphadenopathy.  CHEST WALL AND LOWER NECK: Subcentimeter hypoattenuating right thyroid   nodule. There is subcutaneous fat stranding in the right posterior chest   wall extending into the right flank, compatible with ecchymosis.    ABDOMEN AND PELVIS:  LIVER: Within normal limits.  BILE DUCTS: Normal caliber.  GALLBLADDER: Within normal limits.  SPLEEN: Within normal limits.  PANCREAS: Within normal limits.  ADRENALS: Within normal limits.  KIDNEYS/URETERS: Nonobstructing right renal stone. A 1.2 cm   hyperattenuating left renal lesion, likely a hemorrhagic cyst. Few   additional too small to characterize bilateral renal hypodensities. No   hydronephrosis.    BLADDER: Within normal limits.  REPRODUCTIVE ORGANS: Uterus and adnexa within normal limits.    BOWEL: High density material within the stomach lumen, likely ingested   food debris. No bowel obstruction. Appendix is not visualized. No   evidence of inflammation in the pericecal region. Copious stool in the   colon compatible with constipation.  PERITONEUM: No ascites.  VESSELS: Atherosclerotic changes.  RETROPERITONEUM/LYMPH NODES: No lymphadenopathy.  ABDOMINAL WALL: Within normal limits.  BONES: Acute wedge compression fracture of the superior vertebral   endplate of L2 with less than 50% loss of vertebral body height. No bony   retropulsion. Degenerative changes.    IMPRESSION:  Acute L2 vertebral wedge compression fracture. No bony retropulsion.    Fat stranding in the right posterior chest wall extending into the flank,   likely ecchymosis.    Evaluation for solid organ injury is limited in the absence of   intravenous contrast.        --- End of Report ---            J CARLOS SINGER MD; Attending Radiologist  This document has been electronically signed. Feb 14 2024  1:23AM    < end of copied text >      ORT Score -   Family Hx of substance abuse	Female	      Male  Alcohol 	                                           1                     3  Illegal drugs	                                   2                     3  Rx drugs                                           4 	                  4  Personal Hx of substance abuse		  Alcohol 	                                          3	                  3  Illegal drugs                                     4	                  4  Rx drugs                                            5 	                  5  Age between 16- 45 years	           1                     1  hx preadolescent sexual abuse	   3 	                  0  Psychological disease		  ADD, OCD, bipolar, schizophrenia   2	          2  Depression                                           1 	          1  Total: 1    a score of 3 or lower indicates low risk for opioid abuse		  a score of 4-7 indicates moderate risk for opioid abuse		  a score of 8 or higher indicates high risk for opioid abuse    REVIEW OF SYSTEMS:  CONSTITUTIONAL: No fever + fatigue  HEENT:  No difficulty hearing, no change in vision  NECK: No pain or stiffness  RESPIRATORY: No cough, wheezing, chills or hemoptysis; No shortness of breath  CARDIOVASCULAR: No chest pain, palpitations, dizziness, or leg swelling  GASTROINTESTINAL: No loss of appetite, decreased PO intake. No abdominal or epigastric pain. No nausea, vomiting; No diarrhea or constipation.   GENITOURINARY: No dysuria, frequency, hematuria, retention or incontinence  MUSCULOSKELETAL: + lumbar back joint pain, No joint swelling; no upper or lower motor strength weakness, no saddle anesthesia, bowel/bladder incontinence, + falls   NEURO: No headaches, No numbness/tingling b/l LE, No weakness  PSYCHIATRIC: + hx depression     PHYSICAL EXAM:  GENERAL:  + disheveled Alert & Oriented X4, cooperative, NAD, Good concentration. Speech is clear.   RESPIRATORY: Respirations even and unlabored. Clear to auscultation bilaterally; No rales, rhonchi, wheezing, or rubs  CARDIOVASCULAR: Normal S1/S2, regular rate and rhythm; No murmurs, rubs, or gallops. No JVD.   GASTROINTESTINAL:  Soft, Nontender, Nondistended; Bowel sounds present  PERIPHERAL VASCULAR:  Extremities warm without edema. 2+ Peripheral Pulses, No cyanosis, No calf tenderness  MUSCULOSKELETAL: Motor Strength 5/5 B/L upper and lower extremities; moves all extremities equally against gravity; + decreased back ROM; negative SLR; + left lumbar back tenderness on palpation.    SKIN: Warm, dry, intact. No rashes, lesions, scars or wounds.     Risk factors associated with adverse outcomes related to opioid treatment  [ ]  Concurrent benzodiazepine use  [ ]  History/ Active substance use or alcohol use disorder  [X ] Psychiatric co-morbidity  [ ] Sleep apnea  [ ] COPD  [ ] BMI> 35  [ ] Liver dysfunction  [ ] Renal dysfunction  [ ] CHF  [ ] Smoker  [ ]  Age > 60 years    [X ]  [place]  Reviewed and Copied to Chart. See below.    Plan of care and goal oriented pain management treatment options were discussed with patient and /or primary care giver; all questions and concerns were addressed and care was aligned with patient's wishes.    Educated patient on goal oriented pain management treatment options

## 2024-02-14 NOTE — H&P ADULT - PROBLEM SELECTOR PLAN 1
- presenting w/ RUE pain and lower back pain s/p mechanical fall at the nursing home without LOC or head strike.  - Vitals wnl. Labs wnl.   - CT Ab/P/C showed acute L2 vertebral wedge compression fracture with an area of ecchymosis.   - Right shoulder and humerus Xrays show no fractures/gross abnormalities on wet read.  - Admitted for lumbar compression fracture, pain management   - s/p tylenol and percocet in the ED   - start lidocaine patch, oxycodone 2.5 for mod and 5mg for severe pain PRN w/ bowel regimen   - pain management consult in the AM   - primary team to consult neurosurgery in the AM

## 2024-02-15 ENCOUNTER — TRANSCRIPTION ENCOUNTER (OUTPATIENT)
Age: 69
End: 2024-02-15

## 2024-02-15 PROCEDURE — 99232 SBSQ HOSP IP/OBS MODERATE 35: CPT

## 2024-02-15 RX ORDER — GABAPENTIN 400 MG/1
100 CAPSULE ORAL
Refills: 0 | Status: DISCONTINUED | OUTPATIENT
Start: 2024-02-15 | End: 2024-02-16

## 2024-02-15 RX ADMIN — MUPIROCIN 1 APPLICATION(S): 20 OINTMENT TOPICAL at 08:20

## 2024-02-15 RX ADMIN — PANTOPRAZOLE SODIUM 40 MILLIGRAM(S): 20 TABLET, DELAYED RELEASE ORAL at 06:11

## 2024-02-15 RX ADMIN — LIDOCAINE 1 PATCH: 4 CREAM TOPICAL at 00:23

## 2024-02-15 RX ADMIN — SENNA PLUS 2 TABLET(S): 8.6 TABLET ORAL at 23:05

## 2024-02-15 RX ADMIN — Medication 81 MILLIGRAM(S): at 11:53

## 2024-02-15 RX ADMIN — Medication 1 APPLICATION(S): at 08:21

## 2024-02-15 RX ADMIN — POLYETHYLENE GLYCOL 3350 17 GRAM(S): 17 POWDER, FOR SOLUTION ORAL at 11:54

## 2024-02-15 RX ADMIN — Medication 1 APPLICATION(S): at 17:34

## 2024-02-15 RX ADMIN — Medication 1000 MILLIGRAM(S): at 14:26

## 2024-02-15 RX ADMIN — Medication 1000 MILLIGRAM(S): at 23:05

## 2024-02-15 RX ADMIN — Medication 1000 MILLIGRAM(S): at 06:11

## 2024-02-15 RX ADMIN — GABAPENTIN 100 MILLIGRAM(S): 400 CAPSULE ORAL at 17:34

## 2024-02-15 RX ADMIN — Medication 1000 MILLIGRAM(S): at 06:41

## 2024-02-15 RX ADMIN — Medication 1000 UNIT(S): at 11:53

## 2024-02-15 RX ADMIN — LIDOCAINE 1 PATCH: 4 CREAM TOPICAL at 23:11

## 2024-02-15 RX ADMIN — MUPIROCIN 1 APPLICATION(S): 20 OINTMENT TOPICAL at 17:34

## 2024-02-15 RX ADMIN — ENOXAPARIN SODIUM 40 MILLIGRAM(S): 100 INJECTION SUBCUTANEOUS at 06:11

## 2024-02-15 RX ADMIN — LIDOCAINE 1 PATCH: 4 CREAM TOPICAL at 19:45

## 2024-02-15 RX ADMIN — Medication 1000 MILLIGRAM(S): at 13:47

## 2024-02-15 RX ADMIN — LIDOCAINE 1 PATCH: 4 CREAM TOPICAL at 11:54

## 2024-02-15 NOTE — PROGRESS NOTE ADULT - ASSESSMENT
Pt is a 68F from [facility] ambulates with a walker w/ PMHx of ?muscular dystrophy, schizophrenia, GERD, PAD, anxiety, presenting w/ RUE pain and lower back pain s/p mechanical fall at the nursing home without LOC or head strike. Vitals wnl. Labs wnl. CT Ab/P/Chest  showed acute L2 vertebral wedge compression fracture with an area of ecchymosis. Right shoulder and humerus Xrays show no fractures/gross abnormalities on wet read. Admitted for lumbar compression fracture. Orthopedic, Pain management, PT consulted.   
Confidential Drug Utilization Report  Search Terms: tony mujica, 1955Search Date: 02/14/2024 09:29:13 AM  The Drug Utilization Report below displays all of the controlled substance prescriptions, if any, that your patient has filled in the last twelve months. The information displayed on this report is compiled from pharmacy submissions to the Department, and accurately reflects the information as submitted by the pharmacies.    This report was requested by: Solange Alcocer | Reference #: [id number]    There are no results for the search terms that you entered.

## 2024-02-15 NOTE — DISCHARGE NOTE PROVIDER - HOSPITAL COURSE
Pt is a 68F from [facility] ambulates with a walker w/ PMHx of ?muscular dystrophy, schizophrenia, GERD, PAD, anxiety, presenting w/ RUE pain and lower back pain s/p mechanical fall at the nursing home without LOC or head strike. Vitals wnl. Labs wnl. CT Ab/P/Chest  showed acute L2 vertebral wedge compression fracture with an area of ecchymosis. Right shoulder and humerus Xrays show no fractures/gross abnormalities on wet read. Admitted for lumbar compression fracture. Orthopedic, Pain management, PT consulted.     incomplete 2/15   Pt is a 68F from [facility] ambulates with a walker w/ PMHx of ?muscular dystrophy, schizophrenia, GERD, PAD, anxiety, presenting w/ RUE pain and lower back pain s/p mechanical fall at the nursing home without LOC or head strike. Vitals wnl. Labs wnl. CT Ab/P/Chest  showed acute L2 vertebral wedge compression fracture with an area of ecchymosis. Right shoulder and humerus Xrays show no fractures/gross abnormalities on wet read. Admitted for lumbar compression fracture. Orthopedic, Pain management, PT consulted.     Pt recc: Home PT; with in house PT at [facility] for fall prevention, gait and balance optimization and posture training.    ortho recc: for L2 compression fx. Recommendation: Conservative treatment  - Pain management. DVT ppx with venodynes. PT- WBAT of the lower extremities. Proper body mechanics. Pt is orthopedically stable for discharge  Follow-up with Dr. Rodriguez in ONE WEEK at [phone].     pain mgt recc: Oxycodone 5 mg PO q 4 hours PRN severe pain. Monitor for sedation/ respiratory depression.   Non-opioid pain recommendations   - Acetaminophen 1 gram PO q 8 hours x 2 more days. Lidoderm 4% patch daily. Start gabepentin 100mg PO BID.   - Bowel Regimen: Miralax 17G PO daily, Senna 2 tablets at bedtime for constipation and dulcolax 5mg PO daily PRN constipation  Mild pain (score 1-3):  - Non-pharmacological pain treatment recommendations  - Warm/ Cool packs PRN   - Repositioning extremity, elevation, imagery, relaxation, distraction.  - Physical therapy OOB if no contraindications       Please note that this a brief summary of hospital course please refer to daily progress notes and consult notes for full course and events.     Pt is a 68F from [facility] ambulates with a walker w/ PMHx of ?muscular dystrophy, schizophrenia, GERD, PAD, anxiety, presenting w/ RUE pain and lower back pain s/p mechanical fall at the nursing home without LOC or head strike. Vitals wnl. Labs wnl. CT Ab/P/Chest  showed acute L2 vertebral wedge compression fracture with an area of ecchymosis. Right shoulder and humerus Xrays show no fractures/gross abnormalities on wet read. Admitted for lumbar compression fracture. Orthopedic, Pain management, PT consulted.   Pain management effective with current medications.   PT evaluated and recc: Home PT; with in house PT at [facility] for fall prevention, gait and balance optimization and posture training.  Ortho recc: for L2 compression fx. Recommendation: Conservative treatment  - Pain management. DVT ppx with Venodynes. PT- WBAT of the lower extremities. Proper body mechanics. Pt is orthopedically stable for discharge  Follow-up with Dr. Rodriguez in ONE WEEK at [phone].     Patient is medically optimized for discharge back to assisted living facility.   Discussed discharge plan with attending physician.   Please note that this a brief summary of hospital course please refer to daily progress notes and consult notes for full course and events.

## 2024-02-15 NOTE — DISCHARGE NOTE PROVIDER - NSDCCPCAREPLAN_GEN_ALL_CORE_FT
PRINCIPAL DISCHARGE DIAGNOSIS  Diagnosis: Lumbar vertebral fracture  Assessment and Plan of Treatment: You were admitted for lower back pain and ambulatory dysfunction, frequent falls.   CT chest/abd/pelvise resulted acute L2 vertebral wedge compression fracture with an area of ecchymosis.   - Right shoulder and humerus Xrays show no fractures   You were followed by pain consultant, orthopedic team. recommends continue conservative management with PT. outpatient orthopedic follow up.  PT- WBAT of the lower extremities. Proper body mechanics.  Follow-up with Dr. Rodriguez in ONE WEEK at [phone].   Continue pain medications as prescribed.   -lidocaine patch, oxycodone 2.5 for mod and 5mg for severe pain PRN w/ bowel regimen   Follow up with PT   Follow-up with your primary care physician.  When walking: ensure proper footwear, adequate lighting ,avoid loose rugs and clutter in walkway.   Ensure adequate rest ,nutrition, and hydration.        SECONDARY DISCHARGE DIAGNOSES  Diagnosis: GERD (gastroesophageal reflux disease)  Assessment and Plan of Treatment: Change the factors that you can control. Ask your caregiver for guidance concerning weight loss, quitting smoking, and alcohol consumption. Avoid foods and drinks that make your symptoms worse, such as: Caffeine or alcoholic drinks. Chocolate. spicy foods. Citrus fruits tomato-based foods, Fried and fatty foods.  Avoid lying down for the 3 hours after eating .Eat small, frequent meals  Do not wear anything tight around your waist that causes pressure on your stomach. Raise the head of your bed 6 to 8 inches with wood blocks to help you sleep. Do not take aspirin, ibuprofen, or other nonsteroidal anti-inflammatory drugs (NSAIDs).You have pain in your arms, neck, jaw, teeth, or back. Your pain increases or changes in intensity or duration. You develop nausea, vomiting, or sweating (diaphoresis).You develop shortness of breath, or you faint.Your vomit is green, yellow, black, or looks like coffee grounds or blood.Your stool is red, bloody, or black.These symptoms could be signs of other problems, such as heart disease, gastric bleeding, or esophageal bleeding.      Diagnosis: PAD (peripheral artery disease)  Assessment and Plan of Treatment: Follow-up with your primary care physician.Avoid injury and extreme temperatures to your lower extremities.Call your physician if you develop non-healing wounds , numbness/tingling  in your extremities.      Diagnosis: Status post fall  Assessment and Plan of Treatment: Follow-up with your primary care physician.  When walking: ensure proper footwear, adequate lighting ,avoid loose rugs and clutter in walkway.   Ensure adequate rest ,nutrition, and hydration.       PRINCIPAL DISCHARGE DIAGNOSIS  Diagnosis: Lumbar vertebral fracture  Assessment and Plan of Treatment: You were admitted for lower back pain and ambulatory dysfunction, frequent falls.   CT chest/abd/pelvise resulted acute L2 vertebral wedge compression fracture with an area of ecchymosis.   - Right shoulder and humerus Xrays show no fractures   You were followed by pain consultant, orthopedic team. recommends continue conservative management with PT. outpatient orthopedic follow up.  PT- WBAT of the lower extremities. Proper body mechanics.  Follow-up with Dr. Rodriguez in ONE WEEK at [phone].   Continue pain medications as prescribed.   -lidocaine patch, oxycodone 2.5 for mod and 5mg for severe pain PRN w/ bowel regimen   Follow up with PT   Follow-up with your primary care physician.  When walking: ensure proper footwear, adequate lighting ,avoid loose rugs and clutter in walkway.   Ensure adequate rest ,nutrition, and hydration.        SECONDARY DISCHARGE DIAGNOSES  Diagnosis: GERD (gastroesophageal reflux disease)  Assessment and Plan of Treatment: Change the factors that you can control. Ask your caregiver for guidance concerning weight loss, quitting smoking, and alcohol consumption. Avoid foods and drinks that make your symptoms worse, such as: Caffeine or alcoholic drinks. Chocolate. spicy foods. Citrus fruits tomato-based foods, Fried and fatty foods.  Avoid lying down for the 3 hours after eating .Eat small, frequent meals  Do not wear anything tight around your waist that causes pressure on your stomach. Raise the head of your bed 6 to 8 inches with wood blocks to help you sleep. Do not take aspirin, ibuprofen, or other nonsteroidal anti-inflammatory drugs (NSAIDs).You have pain in your arms, neck, jaw, teeth, or back. Your pain increases or changes in intensity or duration. You develop nausea, vomiting, or sweating (diaphoresis).You develop shortness of breath, or you faint.Your vomit is green, yellow, black, or looks like coffee grounds or blood.Your stool is red, bloody, or black.These symptoms could be signs of other problems, such as heart disease, gastric bleeding, or esophageal bleeding.      Diagnosis: PAD (peripheral artery disease)  Assessment and Plan of Treatment: Follow-up with your primary care physician. Avoid injury and extreme temperatures to your lower extremities.Call your physician if you develop non-healing wounds , numbness/tingling  in your extremities.      Diagnosis: Status post fall  Assessment and Plan of Treatment: Follow-up with your primary care physician.  When walking: ensure proper footwear, adequate lighting ,avoid loose rugs and clutter in walkway.   Ensure adequate rest ,nutrition, and hydration.       PRINCIPAL DISCHARGE DIAGNOSIS  Diagnosis: Lumbar vertebral fracture  Assessment and Plan of Treatment: You were admitted for lower back pain and ambulatory dysfunction, frequent falls.   CT chest/abd/pelvise resulted acute L2 vertebral wedge compression fracture with an area of ecchymosis.   - Right shoulder and humerus Xrays show no fractures   You were followed by pain consultant, orthopedic team. recommends continue conservative management with PT. outpatient orthopedic follow up.  PT- WBAT of the lower extremities. Proper body mechanics.  Follow-up with Dr. Rodriguez in ONE WEEK at [phone].   Continue pain medications as prescribed.   Follow up with PT   Follow-up with your primary care physician.  When walking: ensure proper footwear, adequate lighting ,avoid loose rugs and clutter in walkway.   Ensure adequate rest ,nutrition, and hydration.        SECONDARY DISCHARGE DIAGNOSES  Diagnosis: PAD (peripheral artery disease)  Assessment and Plan of Treatment: Follow-up with your primary care physician. Avoid injury and extreme temperatures to your lower extremities.Call your physician if you develop non-healing wounds , numbness/tingling  in your extremities.      Diagnosis: Superficial injury of skin  Assessment and Plan of Treatment: You were followed by podiatry for right lower leg superficial wound. Wound care instruction: Clean with normal saline or soap/water, apply Santyl, Allevyn pad to be place every other day to right lower leg.    Diagnosis: GERD (gastroesophageal reflux disease)  Assessment and Plan of Treatment: Change the factors that you can control. Ask your caregiver for guidance concerning weight loss, quitting smoking, and alcohol consumption. Avoid foods and drinks that make your symptoms worse, such as: Caffeine or alcoholic drinks. Chocolate. spicy foods. Citrus fruits tomato-based foods, Fried and fatty foods.  Avoid lying down for the 3 hours after eating .Eat small, frequent meals  Do not wear anything tight around your waist that causes pressure on your stomach. Raise the head of your bed 6 to 8 inches with wood blocks to help you sleep. Do not take aspirin, ibuprofen, or other nonsteroidal anti-inflammatory drugs (NSAIDs).You have pain in your arms, neck, jaw, teeth, or back. Your pain increases or changes in intensity or duration. You develop nausea, vomiting, or sweating (diaphoresis).You develop shortness of breath, or you faint.Your vomit is green, yellow, black, or looks like coffee grounds or blood.Your stool is red, bloody, or black.These symptoms could be signs of other problems, such as heart disease, gastric bleeding, or esophageal bleeding.      Diagnosis: Status post fall  Assessment and Plan of Treatment: Follow-up with your primary care physician.  When walking: ensure proper footwear, adequate lighting ,avoid loose rugs and clutter in walkway.   Ensure adequate rest ,nutrition, and hydration.

## 2024-02-15 NOTE — DISCHARGE NOTE NURSING/CASE MANAGEMENT/SOCIAL WORK - NSDCPEFALRISK_GEN_ALL_CORE
For information on Fall & Injury Prevention, visit: [email or web address] OR  [email or web address] OR  https://www.cdc.gov/steadi/patient.html

## 2024-02-15 NOTE — PROGRESS NOTE ADULT - PROBLEM SELECTOR PLAN 1
- presenting w/ RUE pain and lower back pain s/p mechanical fall at the nursing home without LOC or head strike.  - Vitals wnl. Labs wnl.   - CT Ab/P/C showed acute L2 vertebral wedge compression fracture with an area of ecchymosis.   - Right shoulder and humerus Xrays show no fractures/gross abnormalities on wet read.  - Admitted for lumbar compression fracture, pain management   - s/p tylenol and percocet in the ED   - start lidocaine patch, oxycodone 2.5 for mod and 5mg for severe pain PRN w/ bowel regimen   - pain management consulted  - Ortho  consulted
Pt with acute lumbar back pain which is somatic in nature due to L2 wedge compression fracture.  High risk medications reviewed. Avoid polypharmacy. Avoid IV opioids. Avoid NSAIDs and benzodiazepines. Non-pharmacological sleep aides initiated. Non-opioid medications and non-pharmacological pain management measures initiated.   Opioid pain recommendations   -  Oxycodone 5 mg PO q 4 hours PRN severe pain. Monitor for sedation/ respiratory depression.   Non-opioid pain recommendations   - Acetaminophen 1 gram PO q 8 hours x 3 days.   - Lidoderm 4% patch daily.   - Start gabepentin 100mg PO BID.   Bowel Regimen  - Continue Miralax 17G PO daily  - Continue Senna 2 tablets at bedtime for constipation  - Continue dulcolax 5mg PO daily PRN constipation  Mild pain (score 1-3)  - Non-pharmacological pain treatment recommendations  - Warm/ Cool packs PRN   - Repositioning extremity, elevation, imagery, relaxation, distraction.  - Physical therapy OOB if no contraindications   Recommendations discussed with primary team and RN.

## 2024-02-15 NOTE — PHYSICAL THERAPY INITIAL EVALUATION ADULT - NSPTDISCHREC_GEN_A_CORE
with in house PT at [facility] for fall prevention, gait and balance optimization and posture training/Home PT

## 2024-02-15 NOTE — PROGRESS NOTE ADULT - PROBLEM SELECTOR PLAN 4
- lovenox for dvt ppx    Discharge planning  From [facility]  f/u ORTHO reccs, PT eval  Pain management

## 2024-02-15 NOTE — DISCHARGE NOTE PROVIDER - NSDCFUADDAPPT_GEN_ALL_CORE_FT
Follow-up with Dr. Rodriguez in ONE WEEK at [phone].    Follow-up with orthopedic Dr. Rodriguez in ONE WEEK at [phone].

## 2024-02-15 NOTE — DISCHARGE NOTE PROVIDER - CARE PROVIDER_API CALL
Estiven Molina Stahl  Internal Medicine  [address] [facility], [address] [place] [address]  Phone: [phone]  Fax: [phone]  Follow Up Time: 1-3 days   Estiven Molina [facility]  Internal Medicine  [address], [address]  [place] [address]  Phone: [phone]  Fax: [phone]  Follow Up Time: 1-3 days    Tariq Rodriguez  Orthopaedic Surgery  [address] [place] [address], [address] [place] [address]  Phone: [phone]  Fax: [phone]  Follow Up Time: 1 week

## 2024-02-15 NOTE — DISCHARGE NOTE PROVIDER - NSDCHHNEEDSERVICE_GEN_ALL_CORE
Medication teaching and assessment/Observation and assessment/Rehabilitation services/Teaching and training
Vaccine status unknown

## 2024-02-15 NOTE — DISCHARGE NOTE PROVIDER - PROVIDER TOKENS
PROVIDER:[TOKEN:[[id number]],FOLLOWUP:[1-3 days]] PROVIDER:[TOKEN:[[id number]],FOLLOWUP:[1-3 days]],PROVIDER:[TOKEN:[[id number]],FOLLOWUP:[1 week]]

## 2024-02-15 NOTE — CONSULT NOTE ADULT - SUBJECTIVE AND OBJECTIVE BOX
Pt Name: SAVANNAH YAÑEZ  MRN: [id number]      ORTHOPEDIC SPINE CONSULT:    Diagnosis:     Patient is a 68y Female   HPI:  Pt is a 68F from [facility] ambulates with a walker w/ PMHx of ?muscular dystrophy, schizophrenia, GERD, PAD, anxiety, presenting w/ RUE pain and lower back pain s/p mechanical fall at the nursing home. Pt endorses that she has 2 falls over the weekend, once on Saturday and once on Tuesday. She stated that both times she landed on her back, and on Tuesday she also landed on her arm. No LOC and no head strike, not on AC. Denies numbness/ paresthesias in b/l LE, denies urinary or bowel incontinence. Denies dizziness, headaches, chest pain, fevers, chills, cough, sob, abdominal pain, n/v/d/constipation.     Last [facility] admission 3/2023- treated for RLE cellulitis w/ unasyn  (14 Feb 2024 04:30)    Patient seen at bedside complaining of low back pain s/p fall at nursing home. She has history of multiple falls. uses walker, denies dizziness or balance issues before.   Pt denies Chest pain, SOB, dyspnea, paresthesias, N/V/D, abdominal pain, syncope, or pain anywhere else. denies saddleback paresthesias or urinary/fecal incontinence        HEALTH ISSUES - PROBLEM Dx:  GERD (gastroesophageal reflux disease)    Lumbar compression fracture    Prophylactic measure    PAD (peripheral artery disease)    Acute lumbar back pain    Osteoporosis        .    Ambulation: Walking independently [ ] With Cane [ ] With Walker [ ]  Bed / wheelchairbound [ ]     PAST MEDICAL & SURGICAL HISTORY:  GERD (gastroesophageal reflux disease)      H/O: depression      Osteoporosis      H/O personality disorder      No significant past surgical history          Allergies: No Known Allergies      Vital Signs Last 24 Hrs  T(C): 36.3 (15 Feb 2024 05:32), Max: 37 (14 Feb 2024 21:08)  T(F): 97.3 (15 Feb 2024 05:32), Max: 98.6 (14 Feb 2024 21:08)  HR: 75 (15 Feb 2024 05:32) (72 - 83)  BP: 148/80 (15 Feb 2024 05:32) (111/66 - 148/80)  BP(mean): --  RR: 16 (15 Feb 2024 05:32) (16 - 17)  SpO2: 96% (15 Feb 2024 05:32) (94% - 96%)    Parameters below as of 15 Feb 2024 05:32  Patient On (Oxygen Delivery Method): room air        Physical Exam:  Appearance: Alert, responsive, in no acute distress.  Musculoskeletal:         Left Upper Extremity: Atraumatic with normal alignment NROM. No crepitus. No bony tenderness.        Right Upper Extremity: Atraumatic with normal alignment NROM. No crepitus. No bony tenderness.        Left Lower Extremity: Atraumatic with normal alignment NROM. No crepitus. No bony tenderness. No calf tenderness, Calf soft.       Right Lower Extremity: Atraumatic with normal alignment NROM. No crepitus. No bony tenderness.  No calf tenderness, Calf soft.    Neurological: Sensation is grossly intact to light touch. No focal deficits or weaknesses found.    Motor exam:          Spine: Skin is pink warm, Tender of the  L2 area with ecchymosis noted. no open wounds.            Upper extremities: 5/5 strength of the upper extremities           Lower extremities:  5/5 strength of the lower extremities      Labs:                        10.6   7.16  )-----------( 134      ( 14 Feb 2024 00:43 )             33.7     02-14    143  |  110<H>  |  18  ----------------------------<  102<H>  3.8   |  29  |  0.54    Ca    8.6      14 Feb 2024 00:43        Radiology: < from: CT Abdomen and Pelvis No Cont (02.14.24 @ 01:02) >  IMPRESSION:  Acute L2 vertebral wedge compression fracture. No bony retropulsion.    Fat stranding in the right posterior chest wall extending into the flank,   likely ecchymosis.    Evaluation for solid organ injury is limited in the absence of   intravenous contrast.        --- End of Report ---    < end of copied text >        Impression:  Pt is a  68y Female with  L2 compression fx    Plan:  - Recommendation: Conservative treatment  - Pain management  - DVT ppx with venodynes  - PT- WBAT of the lower extremities. Proper body mechanics.  - Pt is orthopedically stable for discharge  - Case d/w Dr. Rodriguez   Pt Name: SAVANNAH YAÑEZ  MRN: [id number]      ORTHOPEDIC SPINE CONSULT:    Diagnosis:     Patient is a 68y Female   HPI:  Pt is a 68F from [facility] ambulates with a walker w/ PMHx of ?muscular dystrophy, schizophrenia, GERD, PAD, anxiety, presenting w/ RUE pain and lower back pain s/p mechanical fall at the nursing home. Pt endorses that she has 2 falls over the weekend, once on Saturday and once on Tuesday. She stated that both times she landed on her back, and on Tuesday she also landed on her arm. No LOC and no head strike, not on AC. Denies numbness/ paresthesias in b/l LE, denies urinary or bowel incontinence. Denies dizziness, headaches, chest pain, fevers, chills, cough, sob, abdominal pain, n/v/d/constipation.     Last [facility] admission 3/2023- treated for RLE cellulitis w/ unasyn  (14 Feb 2024 04:30)    Patient seen at bedside complaining of low back pain s/p fall at nursing home. She has history of multiple falls. uses walker, denies dizziness or balance issues before.   Pt denies Chest pain, SOB, dyspnea, paresthesias, N/V/D, abdominal pain, syncope, or pain anywhere else. denies saddleback paresthesias or urinary/fecal incontinence        HEALTH ISSUES - PROBLEM Dx:  GERD (gastroesophageal reflux disease)    Lumbar compression fracture    Prophylactic measure    PAD (peripheral artery disease)    Acute lumbar back pain    Osteoporosis        .    Ambulation: Walking independently [ ] With Cane [ ] With Walker [ ]  Bed / wheelchairbound [ ]     PAST MEDICAL & SURGICAL HISTORY:  GERD (gastroesophageal reflux disease)      H/O: depression      Osteoporosis      H/O personality disorder      No significant past surgical history          Allergies: No Known Allergies      Vital Signs Last 24 Hrs  T(C): 36.3 (15 Feb 2024 05:32), Max: 37 (14 Feb 2024 21:08)  T(F): 97.3 (15 Feb 2024 05:32), Max: 98.6 (14 Feb 2024 21:08)  HR: 75 (15 Feb 2024 05:32) (72 - 83)  BP: 148/80 (15 Feb 2024 05:32) (111/66 - 148/80)  BP(mean): --  RR: 16 (15 Feb 2024 05:32) (16 - 17)  SpO2: 96% (15 Feb 2024 05:32) (94% - 96%)    Parameters below as of 15 Feb 2024 05:32  Patient On (Oxygen Delivery Method): room air        Physical Exam:  Appearance: Alert, responsive, in no acute distress.  Musculoskeletal:         Left Upper Extremity: Atraumatic with normal alignment NROM. No crepitus. No bony tenderness.        Right Upper Extremity: Atraumatic with normal alignment NROM. No crepitus. No bony tenderness.        Left Lower Extremity: Atraumatic with normal alignment NROM. No crepitus. No bony tenderness. No calf tenderness, Calf soft.       Right Lower Extremity: Atraumatic with normal alignment NROM. No crepitus. No bony tenderness.  No calf tenderness, Calf soft.    Neurological: Sensation is grossly intact to light touch. No focal deficits or weaknesses found.    Motor exam:          Spine: Skin is pink warm, Tender of the  L2 area with ecchymosis noted. no open wounds.            Upper extremities: 5/5 strength of the upper extremities           Lower extremities:  5/5 strength of the lower extremities      Labs:                        10.6   7.16  )-----------( 134      ( 14 Feb 2024 00:43 )             33.7     02-14    143  |  110<H>  |  18  ----------------------------<  102<H>  3.8   |  29  |  0.54    Ca    8.6      14 Feb 2024 00:43        Radiology: < from: CT Abdomen and Pelvis No Cont (02.14.24 @ 01:02) >  IMPRESSION:  Acute L2 vertebral wedge compression fracture. No bony retropulsion.    Fat stranding in the right posterior chest wall extending into the flank,   likely ecchymosis.    Evaluation for solid organ injury is limited in the absence of   intravenous contrast.        --- End of Report ---    < end of copied text >        Impression:  Pt is a  68y Female with  L2 compression fx    Plan:  - Recommendation: Conservative treatment  - Pain management  - DVT ppx with venodynes  - PT- WBAT of the lower extremities. Proper body mechanics.  - Pt is orthopedically stable for discharge  - Case d/w Dr. Rodriguez  Follow-up with Dr. Rodriguez in ONE WEEK at [phone].

## 2024-02-15 NOTE — DISCHARGE NOTE PROVIDER - CARE PROVIDERS DIRECT ADDRESSES
,DirectAddress_Unknown ,DirectAddress_Unknown,[email or web address]

## 2024-02-15 NOTE — PROGRESS NOTE ADULT - PROBLEM SELECTOR PLAN 2
- hx of PAD, with hx of leg wounds in the past   - prior RLE wound well healed, no signs of acute cellulitis   - continue w/ asa

## 2024-02-15 NOTE — PROGRESS NOTE ADULT - SUBJECTIVE AND OBJECTIVE BOX
"    11/21/2023         RE: Precious Paris  [address] [place] [address]        Dear Colleague,    Thank you for referring your patient, Precious Paris, to the [facility]. Please see a copy of my visit note below.    Oncology Rooming Note    November 21, 2023 11:54 AM   Precious Paris is a 80 year old female who presents for:    Chief Complaint   Patient presents with     Oncology Clinic Visit     Malignant neoplasm metastatic to both lungs - Labs provider and infusion     Initial Vitals: BP (!) 143/71 (BP Location: Right arm, Patient Position: Sitting, Cuff Size: Adult Regular)   Pulse 89   Temp 98  F (36.7  C) (Oral)   Resp 12   Ht 1.638 m (5' 4.5\")   Wt 84.4 kg (186 lb)   SpO2 95%   BMI 31.43 kg/m   Estimated body mass index is 31.43 kg/m  as calculated from the following:    Height as of this encounter: 1.638 m (5' 4.5\").    Weight as of this encounter: 84.4 kg (186 lb). Body surface area is 1.96 meters squared.  No Pain (0) Comment: Data Unavailable   No LMP recorded. Patient is postmenopausal.  Allergies reviewed: Yes  Medications reviewed: Yes    Medications: Medication refills not needed today.  Pharmacy name entered into [employer]: [facility] [place] [facility] [id number] [facility]    Clinical concerns:  None      Malgorzata Vallejo, AXEL                  [facility] Hematology and Oncology Progress Note    Patient: Precious Paris  MRN: [id number]  Nov 21, 2023          Reason for Visit    Recurrent stage IV triple negative breast cancer (nodes, lung) (PDL1+)    Primary Oncologist: Dr. Beebe    _____________________________________________________________________________    History of Present Illness/ Interval History    Ms. Precious Paris is a 79 year old with recurrent metastatic triple negative breast cancer recurrence. She initiated 3rd line sacituzumab about 6 weeks ago. She has completed 2 cycles. C2D8 was "
NP Note discussed with  Primary Attending    INTERVAL HPI/OVERNIGHT EVENTS: c/o LBP 6/10, no other complaints.     MEDICATIONS  (STANDING):  acetaminophen     Tablet .. 1000 milliGRAM(s) Oral every 8 hours  ammonium lactate 12% Lotion 1 Application(s) Topical two times a day  aspirin enteric coated 81 milliGRAM(s) Oral daily  cholecalciferol 1000 Unit(s) Oral daily  enoxaparin Injectable 40 milliGRAM(s) SubCutaneous every 24 hours  influenza  Vaccine (HIGH DOSE) 0.7 milliLiter(s) IntraMuscular once  lidocaine   4% Patch 1 Patch Transdermal daily  mupirocin 2% Ointment 1 Application(s) Topical two times a day  naloxone Injectable 0.4 milliGRAM(s) IV Push once  pantoprazole    Tablet 40 milliGRAM(s) Oral before breakfast  polyethylene glycol 3350 17 Gram(s) Oral daily  senna 2 Tablet(s) Oral at bedtime    MEDICATIONS  (PRN):  bisacodyl 5 milliGRAM(s) Oral daily PRN Constipation  oxyCODONE    IR 5 milliGRAM(s) Oral every 4 hours PRN Severe Pain (7 - 10)      __________________________________________________  REVIEW OF SYSTEMS:    CONSTITUTIONAL: No fever,   EYES: no acute visual disturbances  NECK: No pain or stiffness  RESPIRATORY: No cough; No shortness of breath  CARDIOVASCULAR: No chest pain, no palpitations  GASTROINTESTINAL: No pain. No nausea or vomiting; No diarrhea   NEUROLOGICAL: No headache or numbness, no tremors  MUSCULOSKELETAL: Muscular pain lower back. 6/10  GENITOURINARY: no dysuria, no frequency, no hesitancy  PSYCHIATRY: no depression , no anxiety  ALL OTHER  ROS negative        Vital Signs Last 24 Hrs  T(C): 36.3 (15 Feb 2024 05:32), Max: 37 (14 Feb 2024 21:08)  T(F): 97.3 (15 Feb 2024 05:32), Max: 98.6 (14 Feb 2024 21:08)  HR: 75 (15 Feb 2024 05:32) (72 - 83)  BP: 148/80 (15 Feb 2024 05:32) (111/66 - 148/80)  BP(mean): --  RR: 16 (15 Feb 2024 05:32) (16 - 17)  SpO2: 96% (15 Feb 2024 05:32) (94% - 96%)    Parameters below as of 15 Feb 2024 05:32  Patient On (Oxygen Delivery Method): room air        ________________________________________________  PHYSICAL EXAM:  GENERAL: NAD  HEENT: Normocephalic;  conjunctivae and sclerae clear; moist mucous membranes;   NECK : supple  CHEST/LUNG: Clear to auscultation bilaterally with good air entry   HEART: S1 S2  regular; no murmurs, gallops or rubs  ABDOMEN: Soft, Nontender, Nondistended; Bowel sounds present  EXTREMITIES: no cyanosis; no edema; no calf tenderness  SKIN: warm and dry; b/l LEs mild discoloration   NERVOUS SYSTEM:  Awake and alert; Oriented  to place, person and time ; no new deficits    _________________________________________________  LABS:                        10.6   7.16  )-----------( 134      ( 14 Feb 2024 00:43 )             33.7     02-14    143  |  110<H>  |  18  ----------------------------<  102<H>  3.8   |  29  |  0.54    Ca    8.6      14 Feb 2024 00:43        Urinalysis Basic - ( 14 Feb 2024 00:43 )    Color: x / Appearance: x / SG: x / pH: x  Gluc: 102 mg/dL / Ketone: x  / Bili: x / Urobili: x   Blood: x / Protein: x / Nitrite: x   Leuk Esterase: x / RBC: x / WBC x   Sq Epi: x / Non Sq Epi: x / Bacteria: x      CAPILLARY BLOOD GLUCOSE  RADIOLOGY & ADDITIONAL TESTS:    Imaging  Reviewed:  YES  < from: CT Abdomen and Pelvis No Cont (02.14.24 @ 01:02) >    ACC: [id number] EXAM:  CT ABDOMEN AND PELVIS   ORDERED BY: MAX LAZARUS     ACC: [id number] EXAM:  CT CHEST   ORDERED BY: MAX LAZARUS     PROCEDURE DATE:  02/14/2024          INTERPRETATION:  CLINICAL INFORMATION: Status post fall, flank ecchymosis    COMPARISON: CT lumbar spine 7/13/2022.    CONTRAST/COMPLICATIONS:  IV Contrast: None  Oral Contrast: None  Complications: None    PROCEDURE:  CT of the Chest, Abdomen and Pelvis was performed.  Sagittal and coronal reformats were performed.    FINDINGS:  CHEST:  LUNGS AND LARGE AIRWAYS: Patent central airways. Right lower lobe distal   mucoid impactions and scattered tree-in-bud nodules. Biapical pleural   parenchymal fibrosis and traction bronchiectasis. Additional areas of   pleural thickening bilaterally, including linear bibasilar scarring   versus subsegmental atelectasis. Few additional scattered nonspecific   subcentimeter nodules are seen bilaterally.  PLEURA: No pleural effusion.  VESSELS: Aortic and coronary artery calcifications.  HEART: Heart size is normal. No pericardial effusion.  MEDIASTINUM AND CATHY: No lymphadenopathy.  CHEST WALL AND LOWER NECK: Subcentimeter hypoattenuating right thyroid   nodule. There is subcutaneous fat stranding in the right posterior chest   wall extending into the right flank, compatible with ecchymosis.    ABDOMEN AND PELVIS:  LIVER: Within normal limits.  BILE DUCTS: Normal caliber.  GALLBLADDER: Within normal limits.  SPLEEN: Within normal limits.  PANCREAS: Within normal limits.  ADRENALS: Within normal limits.  KIDNEYS/URETERS: Nonobstructing right renal stone. A 1.2 cm   hyperattenuating left renal lesion, likely a hemorrhagic cyst. Few   additional too small to characterize bilateral renal hypodensities. No   hydronephrosis.    BLADDER: Within normal limits.  REPRODUCTIVE ORGANS: Uterus and adnexa within normal limits.    BOWEL: High density material within the stomach lumen, likely ingested   food debris. No bowel obstruction. Appendix is not visualized. No   evidence of inflammation in the pericecal region. Copious stool in the   colon compatible with constipation.  PERITONEUM: No ascites.  VESSELS: Atherosclerotic changes.  RETROPERITONEUM/LYMPH NODES: No lymphadenopathy.  ABDOMINAL WALL: Within normal limits.  BONES: Acute wedge compression fracture of the superior vertebral   endplate of L2 with less than 50% loss of vertebral body height. No bony   retropulsion. Degenerative changes.    IMPRESSION:  Acute L2 vertebral wedge compression fracture. No bony retropulsion.    Fat stranding in the right posterior chest wall extending into the flank,   likely ecchymosis.    Evaluation for solid organ injury is limited in the absence of   intravenous contrast.        --- End of Report ---            J CARLOS SINGER MD; Attending Radiologist  This document has been electronically signed. Feb 14 2024  1:23AM    < end of copied text >    < from: Xray Humerus, Right (02.13.24 @ 21:41) >    ACC: [id number] EXAM:  XR HUMERUS MIN 2 VIEWS RT   ORDERED BY: MAX LAZARUS     ACC: [id number] EXAM:  XR SHOULDER COMP MIN 2V RT   ORDERED BY: MAX LAZARUS     PROCEDURE DATE:  02/13/2024          INTERPRETATION:  Clinical history: 68-year-old female, status post fall.    Three views of the right shoulder and 2 views of the right humerus are   correlated with a concurrent chest CT    FINDINGS: Mild degenerative change with no fracture, dislocation or   radiographic soft tissue abnormality.    Visualized thorax evaluated on concurrent chest CT.    IMPRESSION:    No fracture or dislocation    --- End of Report ---    ENRRIQUE WATSON DO; Attending Radiologist  This document has been electronically signed. Feb 14 2024 11:39AM    < end of copied text >    Consultant(s) Notes Reviewed:   YES      Plan of care was discussed with patient and /or primary care giver; all questions and concerns were addressed 
Patient is a 68y old  Female who presents with a chief complaint of lumbar fracture, mechanical fall (15 Feb 2024 18:02)    PATIENT IS SEEN AND EXAMINED IN MEDICAL FLOOR.      ALLERGIES:  No Known Allergies      VITALS:    Vital Signs Last 24 Hrs  T(C): 36.9 (15 Feb 2024 20:44), Max: 36.9 (15 Feb 2024 14:14)  T(F): 98.4 (15 Feb 2024 20:44), Max: 98.4 (15 Feb 2024 14:14)  HR: 78 (15 Feb 2024 20:44) (75 - 80)  BP: 134/80 (15 Feb 2024 20:44) (114/69 - 148/80)  BP(mean): --  RR: 16 (15 Feb 2024 20:44) (16 - 16)  SpO2: 98% (15 Feb 2024 20:44) (95% - 98%)    Parameters below as of 15 Feb 2024 20:44  Patient On (Oxygen Delivery Method): room air        LABS:              CAPILLARY BLOOD GLUCOSE              Creatinine Trend: 0.54<--  I&O's Summary              MEDICATIONS:    MEDICATIONS  (STANDING):  acetaminophen     Tablet .. 1000 milliGRAM(s) Oral every 8 hours  ammonium lactate 12% Lotion 1 Application(s) Topical two times a day  aspirin enteric coated 81 milliGRAM(s) Oral daily  cholecalciferol 1000 Unit(s) Oral daily  enoxaparin Injectable 40 milliGRAM(s) SubCutaneous every 24 hours  gabapentin 100 milliGRAM(s) Oral two times a day  influenza  Vaccine (HIGH DOSE) 0.7 milliLiter(s) IntraMuscular once  lidocaine   4% Patch 1 Patch Transdermal daily  mupirocin 2% Ointment 1 Application(s) Topical two times a day  naloxone Injectable 0.4 milliGRAM(s) IV Push once  pantoprazole    Tablet 40 milliGRAM(s) Oral before breakfast  polyethylene glycol 3350 17 Gram(s) Oral daily  senna 2 Tablet(s) Oral at bedtime      MEDICATIONS  (PRN):  bisacodyl 5 milliGRAM(s) Oral daily PRN Constipation  oxyCODONE    IR 5 milliGRAM(s) Oral every 4 hours PRN Severe Pain (7 - 10)      REVIEW OF SYSTEMS:                           ALL ROS DONE [ X   ]    CONSTITUTIONAL:  LETHARGIC [   ], FEVER [   ], UNRESPONSIVE [   ]  CVS:  CP  [   ], SOB, [   ], PALPITATIONS [   ], DIZZYNESS [   ]  RS: COUGH [   ], SPUTUM [   ]  GI: ABDOMINAL PAIN [   ], NAUSEA [   ], VOMITINGS [   ], DIARRHEA [   ], CONSTIPATION [   ]  :  DYSURIA [   ], NOCTURIA [   ], INCREASED FREQUENCY [   ], DRIBLING [   ],  SKELETAL: PAINFUL JOINTS [   ], SWOLLEN JOINTS [   ], NECK ACHE [   ], LOW BACK ACHE [   ],  SKIN : ULCERS [   ], RASH [   ], ITCHING [   ]  CNS: HEAD ACHE [   ], DOUBLE VISION [   ], BLURRED VISION [   ], AMS / CONFUSION [   ], SEIZURES [   ], WEAKNESS [   ],TINGLING / NUMBNESS [   ]    PHYSICAL EXAMINATION:  GENERAL APPEARANCE: NO DISTRESS  HEENT:  NO PALLOR, NO  JVD,  NO   NODES, NECK SUPPLE  CVS: S1 +, S2 +,   RS: AEEB,  OCCASIONAL  RALES +,   NO RONCHI  ABD: SOFT, NT, NO, BS +  EXT: NO PE  SKIN: WARM,   SKELETAL:  ROM ACCEPTABLE  CNS:  AAO X 3    RADIOLOGY :    RADIOLOGY AND READINGS REVIEWED    ASSESSMENT :     Unspecified fracture of unspecified lumbar vertebra, initial encounter for closed fracture    GERD (gastroesophageal reflux disease)    H/O: depression    Osteoporosis    H/O personality disorder      PLAN:  HPI:  Pt is a 68F from [facility] ambulates with a walker w/ PMHx of ?muscular dystrophy, schizophrenia, GERD, PAD, anxiety, presenting w/ RUE pain and lower back pain s/p mechanical fall at the nursing home. Pt endorses that she has 2 falls over the weekend, once on Saturday and once on Tuesday. She stated that both times she landed on her back, and on Tuesday she also landed on her arm. No LOC and no head strike, not on AC. Denies numbness/ paresthesias in b/l LE, denies urinary or bowel incontinence. Denies dizziness, headaches, chest pain, fevers, chills, cough, sob, abdominal pain, n/v/d/constipation.     Last [facility] admission 3/2023- treated for RLE cellulitis w/ unasyn  (14 Feb 2024 04:30)      # S/P MECHANICAL FALL  # ACUTE L2 COMPRESSION FRACTURE  # RIGHT ARM INJURY  - NOTED CT CHEST/ABD/PELVIS  - NOTED XRAY RIGHT SHOULDER AND HUMERUS  - PRN PAIN CONTROL  - F/U PT EVAL    # SEVERE PROTEIN CALORIE MALNUTRITION  - NUTRITIONAL SUPPLEMENT    # AMBULATORY DYSFUNCTION S/T KYPHOSIS, PERIPHERAL NEUROPATHY, VENOUS INSUFFICIENCY  - F/U PT EVAL    # SCHIZOPHRENIA, ANXIETY    # GERD    # PAD    # GI AND DVT PPX .      
delayed a week for neutropenia (so delivered over a 4-week cycle).     He has finished 3 cycles so far.  Here with repeat PET scan and labs.  Tolerating it pretty well.  Some diarrhea.  No skin rashes.  No significant peripheral neuropathy symptoms.    Eating well.  No fevers.  Overall seems to be doing really well on sacituzumab.    ECOG PS: 1      Oncology History/Treatment  Diagnosis/Stage:   11/2019: Right breast cancer, triple negative ([id number])    BRCA negative    10/2022: Recurrent, stage IV triple negative breast cancer (bilateral axillary, supraclav, mediastinal nodes + lung)  -persistent/progressive right breast firmness with new right nipple drainage and right axillary adenopathy  -bilateral diagnostic mammogram: increased density R breast with skin thickening. R breast US: 3.2 cm hypoechoic mass at 11:00 position 1 cm from nipple and multiple other small hypoechoic solid-appearing lesions in R breast + right axillary adenopathy (at least two hypoechoic vascular lesions measuring up to 2.5 cm)  -10/18/2022 biopsy R breast mass + R axillary node: grade 3 invasive ductal cancer with tumor necrosis, ER/AR/HER2 negative.   -PET: + right breast mass; bilateral axillary, retropectoral, supraclavicular, mediastinal/R hilar nodes and bilateral lung/R pleural nodules suspicious for mets.  -Brain MRI: negative for mets  -11/1/2022 biopsy L axillary node: metastatic invasive ductal carcinoma  -Foundation One: no actionable mutations MS stable, TMB 1 mut/mb.   -PDL1 testing: CPS >20% and TPS 60% (considered high PDL1 expression)  -CA 27.29: 59.5 (elevated). CA 15-3: 23 (normal)  -9/10/2023 Brain MRI: 2 new foci of punctate enhancement in left frontal cortex and right lentiform nucleus, concerning for brain metastases (asymptomatic)    NGS (foundation 1)  FGF R1 amplification.  Pazopanib has been approved in other tumors but not in breast cancer.  NTRK 1 amplification.  Not actionable  She has multiple other mutations 
which are not actionable.      Treatment:  Initial breast cancer (2019)  -2019: Neoadjuvant taxol  -Lumpectomy  -5/2020: Adjuvant RT  -6 - 7/2020: Adjuvant Xeloda. Stopped for rash    Recurrent, stage IV breast cancer (2022)  -11/14/2022:Doxil every 4 weeks  -12/12/2022: changed to doxorubicin every 3 weeks. (Planning addition of pembrolizumab once approved by insurance)  -1/2/2023: doxorubicin + pembrolizumab every 3 weeks. Completed 1 cycle. Stopped for progression (med nodes, slight increase in lung mets and stable R breast mass + axillary nodes).    -1/23/2023 - present: Carbo AUC 5, Gemcitabine 800 mg/m2 and Pembro every 21 days  -cycles 1-2: [place] day 1 only  -Cycle 3: [place] increased to days 1 + 8 given excellent tolerance  -CT after 3 cycles showed excellent response (near CR axillary + med nodes and MD in lung mets; clinical improvement in right breast changes and tumor marker).  -PET scan after 6 cycles of carboplatin gemcitabine pembrolizumab shows complete response    5/30 - 8/22/2023: Maintenance pembrolizumab, until progression (recurrent axillary, thoracic nodes and 2 punctate brain lesions).  Biopsy of left axillary node confirmed recurrent/metastatic breast cancer.    9/12/2023 - present:  Sacituzumab days 1, 8 every 21 days  --C2D8 deferred x 1 week for neutropenia      Physical Exam    GENERAL: Alert and oriented to time place and person. Seated comfortably. In no distress.   Lungs: clear bilaterally  CVS: RRR systolic murmur heard  Lymph: Bilateral ax adenopathy has significantly decreased  Abd: Soft, non-distended  Extremities: No edema  Neuro: alert and oriented x 3      Lab Results  Recent Results (from the past 168 hour(s))   Comprehensive metabolic panel    Collection Time: 11/21/23 11:41 AM   Result Value Ref Range    Sodium 139 135 - 145 mmol/L    Potassium 3.4 3.4 - 5.3 mmol/L    Carbon Dioxide (CO2) 29 22 - 29 mmol/L    Anion Gap 10 7 - 15 mmol/L    Urea Nitrogen 12.0 8.0 - 23.0 mg/dL    
Creatinine 0.61 0.51 - 0.95 mg/dL    GFR Estimate 89 >60 mL/min/1.73m2    Calcium 9.4 8.8 - 10.2 mg/dL    Chloride 100 98 - 107 mmol/L    Glucose 102 (H) 70 - 99 mg/dL    Alkaline Phosphatase 95 40 - 150 U/L    AST 21 0 - 45 U/L    ALT 19 0 - 50 U/L    Protein Total 7.1 6.4 - 8.3 g/dL    Albumin 4.0 3.5 - 5.2 g/dL    Bilirubin Total 0.2 <=1.2 mg/dL   CBC with platelets and differential    Collection Time: 11/21/23 11:41 AM   Result Value Ref Range    WBC Count 5.4 4.0 - 11.0 10e3/uL    RBC Count 3.52 (L) 3.80 - 5.20 10e6/uL    Hemoglobin 10.9 (L) 11.7 - 15.7 g/dL    Hematocrit 32.3 (L) 35.0 - 47.0 %    MCV 92 78 - 100 fL    MCH 31.0 26.5 - 33.0 pg    MCHC 33.7 31.5 - 36.5 g/dL    RDW 16.2 (H) 10.0 - 15.0 %    Platelet Count 224 150 - 450 10e3/uL    % Neutrophils 60 %    % Lymphocytes 25 %    % Monocytes 12 %    % Eosinophils 1 %    % Basophils 1 %    % Immature Granulocytes 1 %    NRBCs per 100 WBC 0 <1 /100    Absolute Neutrophils 3.3 1.6 - 8.3 10e3/uL    Absolute Lymphocytes 1.4 0.8 - 5.3 10e3/uL    Absolute Monocytes 0.7 0.0 - 1.3 10e3/uL    Absolute Eosinophils 0.1 0.0 - 0.7 10e3/uL    Absolute Basophils 0.0 0.0 - 0.2 10e3/uL    Absolute Immature Granulocytes 0.0 <=0.4 10e3/uL    Absolute NRBCs 0.0 10e3/uL         Assessment/Plan  Recurrent, stage IV triple negative right breast cancer (nodes, lung); CPS>20% + TPS 60%  Mild intermittent peripheral neuropathy fingertips (gr 1)  Punctate brain lesions concerning for metastatic disease (asymptomatic), resolved on chemo  Grade 1 chemotherapy-induced diarrhea  Grade 3 chemotherapy-induced neutropenia  Status post 3 cycles of sacituzumab.  Her cycle 2-day 8 was delayed by a week due to neutropenia.  But since then she has not had any significant issues.  She is here with repeat PET scan.  I personally reviewed the PET scan images and report and independently interpreted the results.  She appears to have a near complete response.  The previously noted axillary and 
thoracic adenopathy have significantly decreased in size and FDG avid ET.  She has a mildly FDG avid left axillary lymph node that is still there.  No evidence of new metastasis.    She seems to be handling treatment really well.  Apart from neutropenia which caused a delay in her treatment by a week, no other significant issues.  Stable peripheral neuropathy symptoms.  Repeat labs reviewed today.  Total white count is 5.4.  Normal ANC.  Hemoglobin is 10.9.  Normal platelet count.  Serum chemistry is pretty normal.  Has mildly elevated glucose.  Overall no contraindications to proceed with day 1 cycle 4 sacituzumab.  No dose changes made.  She will get labs in the 8 next week.  She will be leaving to [place] on December 9 and has an appointment with an oncologist there on December 13.  She will be due for day 1 cycle 5 sacituzumab on December 12.  I asked her to call them ahead of time and try to get drug approved and scheduled as soon as possible.  She will continue infusions there.  She is planning to come back in March or April.  She will call us at that time to schedule further appointments here.      With respect to surveillance I was planning to do PET scan after every 3 or 4 cycles.      Billing:  A total of 35 min were spent today on this visit which included face to face conversation with the patient, EMR review, counseling and co-ordination of care and medical documentation.      Signed by:   Maricarmen Beebe MD  Hematology and Medical Oncology  [facility]        Again, thank you for allowing me to participate in the care of your patient.        Sincerely,        Maricarmen Beebe MD  
  Source of information: SAVANNAH YAÑEZ, Chart review  Patient language: English  : n/a    HPI:  Pt is a 68F from [facility] ambulates with a walker w/ PMHx of ?muscular dystrophy, schizophrenia, GERD, PAD, anxiety, presenting w/ RUE pain and lower back pain s/p mechanical fall at the nursing home. Pt endorses that she has 2 falls over the weekend, once on Saturday and once on Tuesday. She stated that both times she landed on her back, and on Tuesday she also landed on her arm. No LOC and no head strike, not on AC. Denies numbness/ paresthesias in b/l LE, denies urinary or bowel incontinence. Denies dizziness, headaches, chest pain, fevers, chills, cough, sob, abdominal pain, n/v/d/constipation.     Last [facility] admission 3/2023- treated for RLE cellulitis w/ unasyn  (14 Feb 2024 04:30)    Pt is admitted for  L2 wedge compression fracture s/p fall. Pain consulted 2/14 for back pain. Pt seen and examined at bedside. Reports lumbar back pain score 5/10 SCALE USED: (1-10 VNRS). Pt describes pain as constant, aching, greatest over right lumbar back, radiating to left lumbar back, does not radiate down legs, alleviated by pain medication, exacerbated by movement. Pt tolerating PO diet. Denies lethargy, chest pain, SOB, nausea, vomiting, constipation. Reports last BM 2/14. Patient stated goal for pain control: to be able to take deep breaths, get out of bed to chair and ambulate with tolerable pain control. Reports ambulating with walker at baseline. Pt denies taking medications for pain at home. Plan to ambulate with PT.     PAST MEDICAL & SURGICAL HISTORY:  GERD (gastroesophageal reflux disease)      H/O: depression      Osteoporosis      H/O personality disorder      No significant past surgical history          FAMILY HISTORY:      Social History:   [X ] Denies ETOH use, illicit drug use and smoking    Allergies    No Known Allergies    MEDICATIONS  (STANDING):  acetaminophen     Tablet .. 1000 milliGRAM(s) Oral every 8 hours  ammonium lactate 12% Lotion 1 Application(s) Topical two times a day  aspirin enteric coated 81 milliGRAM(s) Oral daily  cholecalciferol 1000 Unit(s) Oral daily  enoxaparin Injectable 40 milliGRAM(s) SubCutaneous every 24 hours  influenza  Vaccine (HIGH DOSE) 0.7 milliLiter(s) IntraMuscular once  lidocaine   4% Patch 1 Patch Transdermal daily  mupirocin 2% Ointment 1 Application(s) Topical two times a day  naloxone Injectable 0.4 milliGRAM(s) IV Push once  pantoprazole    Tablet 40 milliGRAM(s) Oral before breakfast  polyethylene glycol 3350 17 Gram(s) Oral daily  senna 2 Tablet(s) Oral at bedtime    MEDICATIONS  (PRN):  bisacodyl 5 milliGRAM(s) Oral daily PRN Constipation  oxyCODONE    IR 5 milliGRAM(s) Oral every 4 hours PRN Severe Pain (7 - 10)      Vital Signs Last 24 Hrs  T(C): 36.9 (15 Feb 2024 14:14), Max: 37 (14 Feb 2024 21:08)  T(F): 98.4 (15 Feb 2024 14:14), Max: 98.6 (14 Feb 2024 21:08)  HR: 75 (15 Feb 2024 14:14) (72 - 83)  BP: 114/69 (15 Feb 2024 14:14) (111/66 - 148/80)  BP(mean): --  RR: 16 (15 Feb 2024 14:14) (16 - 17)  SpO2: 95% (15 Feb 2024 14:14) (94% - 96%)    Parameters below as of 15 Feb 2024 14:14  Patient On (Oxygen Delivery Method): room air        LABS: Reviewed                          10.6   7.16  )-----------( 134      ( 14 Feb 2024 00:43 )             33.7     02-14    143  |  110<H>  |  18  ----------------------------<  102<H>  3.8   |  29  |  0.54    Ca    8.6      14 Feb 2024 00:43      Urinalysis Basic - ( 14 Feb 2024 00:43 )    Color: x / Appearance: x / SG: x / pH: x  Gluc: 102 mg/dL / Ketone: x  / Bili: x / Urobili: x   Blood: x / Protein: x / Nitrite: x   Leuk Esterase: x / RBC: x / WBC x   Sq Epi: x / Non Sq Epi: x / Bacteria: x    Radiology: Reviewed.   < from: CT Abdomen and Pelvis No Cont (02.14.24 @ 01:02) >    ACC: [id number] EXAM:  CT ABDOMEN AND PELVIS   ORDERED BY: MAX LAZARUS     ACC: [id number] EXAM:  CT CHEST   ORDERED BY: MAX LAZARUS     PROCEDURE DATE:  02/14/2024          INTERPRETATION:  CLINICAL INFORMATION: Status post fall, flank ecchymosis    COMPARISON: CT lumbar spine 7/13/2022.    CONTRAST/COMPLICATIONS:  IV Contrast: None  Oral Contrast: None  Complications: None    PROCEDURE:  CT of the Chest, Abdomen and Pelvis was performed.  Sagittal and coronal reformats were performed.    FINDINGS:  CHEST:  LUNGS AND LARGE AIRWAYS: Patent central airways. Right lower lobe distal   mucoid impactions and scattered tree-in-bud nodules. Biapical pleural   parenchymal fibrosis and traction bronchiectasis. Additional areas of   pleural thickening bilaterally, including linear bibasilar scarring   versus subsegmental atelectasis. Few additional scattered nonspecific   subcentimeter nodules are seen bilaterally.  PLEURA: No pleural effusion.  VESSELS: Aortic and coronary artery calcifications.  HEART: Heart size is normal. No pericardial effusion.  MEDIASTINUM AND CATHY: No lymphadenopathy.  CHEST WALL AND LOWER NECK: Subcentimeter hypoattenuating right thyroid   nodule. There is subcutaneous fat stranding in the right posterior chest   wall extending into the right flank, compatible with ecchymosis.    ABDOMEN AND PELVIS:  LIVER: Within normal limits.  BILE DUCTS: Normal caliber.  GALLBLADDER: Within normal limits.  SPLEEN: Within normal limits.  PANCREAS: Within normal limits.  ADRENALS: Within normal limits.  KIDNEYS/URETERS: Nonobstructing right renal stone. A 1.2 cm   hyperattenuating left renal lesion, likely a hemorrhagic cyst. Few   additional too small to characterize bilateral renal hypodensities. No   hydronephrosis.    BLADDER: Within normal limits.  REPRODUCTIVE ORGANS: Uterus and adnexa within normal limits.    BOWEL: High density material within the stomach lumen, likely ingested   food debris. No bowel obstruction. Appendix is not visualized. No   evidence of inflammation in the pericecal region. Copious stool in the   colon compatible with constipation.  PERITONEUM: No ascites.  VESSELS: Atherosclerotic changes.  RETROPERITONEUM/LYMPH NODES: No lymphadenopathy.  ABDOMINAL WALL: Within normal limits.  BONES: Acute wedge compression fracture of the superior vertebral   endplate of L2 with less than 50% loss of vertebral body height. No bony   retropulsion. Degenerative changes.    IMPRESSION:  Acute L2 vertebral wedge compression fracture. No bony retropulsion.    Fat stranding in the right posterior chest wall extending into the flank,   likely ecchymosis.    Evaluation for solid organ injury is limited in the absence of   intravenous contrast.        --- End of Report ---            J CARLOS SINGER MD; Attending Radiologist  This document has been electronically signed. Feb 14 2024  1:23AM    < end of copied text >      ORT Score -   Family Hx of substance abuse	Female	      Male  Alcohol 	                                           1                     3  Illegal drugs	                                   2                     3  Rx drugs                                           4 	                  4  Personal Hx of substance abuse		  Alcohol 	                                          3	                  3  Illegal drugs                                     4	                  4  Rx drugs                                            5 	                  5  Age between 16- 45 years	           1                     1  hx preadolescent sexual abuse	   3 	                  0  Psychological disease		  ADD, OCD, bipolar, schizophrenia   2	          2  Depression                                           1 	          1  Total: 1    a score of 3 or lower indicates low risk for opioid abuse		  a score of 4-7 indicates moderate risk for opioid abuse		  a score of 8 or higher indicates high risk for opioid abuse    REVIEW OF SYSTEMS:  CONSTITUTIONAL: No fever + fatigue  HEENT:  No difficulty hearing, no change in vision  NECK: No pain or stiffness  RESPIRATORY: No cough, wheezing, chills or hemoptysis; No shortness of breath  CARDIOVASCULAR: No chest pain, palpitations, dizziness, or leg swelling  GASTROINTESTINAL: No loss of appetite, decreased PO intake. No abdominal or epigastric pain. No nausea, vomiting; No diarrhea or constipation.   GENITOURINARY: No dysuria, frequency, hematuria, retention or incontinence  MUSCULOSKELETAL: + lumbar back joint pain, No joint swelling; no upper or lower motor strength weakness, no saddle anesthesia, bowel/bladder incontinence, + falls   NEURO: No headaches, No numbness/tingling b/l LE, No weakness  PSYCHIATRIC: + hx depression, personality disorder     PHYSICAL EXAM:  GENERAL:  + disheveled Alert & Oriented X4, cooperative, NAD, Poor concentration. Speech is clear. +tangential   RESPIRATORY: Respirations even and unlabored. Clear to auscultation bilaterally; No rales, rhonchi, wheezing, or rubs  CARDIOVASCULAR: Normal S1/S2, regular rate and rhythm; No murmurs, rubs, or gallops. No JVD.   GASTROINTESTINAL:  Soft, Nontender, Nondistended; Bowel sounds present  PERIPHERAL VASCULAR:  Extremities warm without edema. 2+ Peripheral Pulses, No cyanosis, No calf tenderness  MUSCULOSKELETAL: Motor Strength 5/5 B/L upper and lower extremities; moves all extremities equally against gravity; + decreased back ROM; negative SLR; + left lumbar back tenderness on palpation.    SKIN: Warm, dry, intact. No rashes, lesions, scars or wounds.     Risk factors associated with adverse outcomes related to opioid treatment  [ ]  Concurrent benzodiazepine use  [ ]  History/ Active substance use or alcohol use disorder  [X ] Psychiatric co-morbidity  [ ] Sleep apnea  [ ] COPD  [ ] BMI> 35  [ ] Liver dysfunction  [ ] Renal dysfunction  [ ] CHF  [ ] Smoker  [ ]  Age > 60 years    [X ]  [place]  Reviewed and Copied to Chart. See below.    Plan of care and goal oriented pain management treatment options were discussed with patient and /or primary care giver; all questions and concerns were addressed and care was aligned with patient's wishes.    Educated patient on goal oriented pain management treatment options     02-15-24 @ 14:43

## 2024-02-15 NOTE — DISCHARGE NOTE NURSING/CASE MANAGEMENT/SOCIAL WORK - PATIENT PORTAL LINK FT
You can access the FollowMyHealth Patient Portal offered by [facility] by registering at the following website: [email or web address]. By joining [employer]’s FollowMyHealth portal, you will also be able to view your health information using other applications (apps) compatible with our system.

## 2024-02-15 NOTE — DISCHARGE NOTE PROVIDER - NSDCFUADDINST_GEN_ALL_CORE_FT
Wound care instruction: Clean with normal saline  or soap/water, apply Santyl, Allevyn pad to be place every other day to right lower leg.   PT- WBAT of the lower extremities. Proper body mechanics.

## 2024-02-15 NOTE — DISCHARGE NOTE PROVIDER - NSDCMRMEDTOKEN_GEN_ALL_CORE_FT
Aspirin Enteric Coated 81 mg oral delayed release tablet: 1 tab(s) orally once a day  Bactroban 2% topical ointment: Apply topically to affected area once a day RIGHT LEG AND BACK  halobetasol 0.05% topical cream: Apply topically to affected area once a day to b/l LE  Lac-Hydrin 12% topical cream: Apply topically to affected area 2 times a day B/L FEET AND LEGS  Protonix 40 mg oral delayed release tablet: 1 tab(s) orally once a day  Vitamin D3 25 mcg (1000 intl units) oral tablet: 1 tab(s) orally once a day   acetaminophen 500 mg oral tablet: 2 tab(s) orally every 8 hours  Aspirin Enteric Coated 81 mg oral delayed release tablet: 1 tab(s) orally once a day  bisacodyl 5 mg oral delayed release tablet: 1 tab(s) orally once a day As needed Constipation  collagenase 250 units/g topical ointment: Apply topically to affected area every 48 hours apply to right lower leg wound change every other day and as needed if dressing is soiled.  gabapentin 100 mg oral capsule: 1 cap(s) orally 2 times a day  halobetasol 0.05% topical cream: Apply topically to affected area once a day to b/l LE  Lac-Hydrin 12% topical cream: Apply topically to affected area 2 times a day B/L FEET AND LEGS  Lidocare Pain Relief Patch 4% topical film: Apply topically to affected area once a day lower back  polyethylene glycol 3350 oral powder for reconstitution: 17 gram(s) orally once a day  Protonix 40 mg oral delayed release tablet: 1 tab(s) orally once a day  senna leaf extract oral tablet: 2 tab(s) orally once a day (at bedtime)  Vitamin D3 25 mcg (1000 intl units) oral tablet: 1 tab(s) orally once a day

## 2024-02-16 VITALS
OXYGEN SATURATION: 95 % | SYSTOLIC BLOOD PRESSURE: 145 MMHG | TEMPERATURE: 98 F | HEART RATE: 84 BPM | DIASTOLIC BLOOD PRESSURE: 82 MMHG | RESPIRATION RATE: 17 BRPM

## 2024-02-16 LAB
ALBUMIN SERPL ELPH-MCNC: 3.1 G/DL — LOW (ref 3.5–5)
ALP SERPL-CCNC: 88 U/L — SIGNIFICANT CHANGE UP (ref 40–120)
ALT FLD-CCNC: 21 U/L DA — SIGNIFICANT CHANGE UP (ref 10–60)
ANION GAP SERPL CALC-SCNC: 1 MMOL/L — LOW (ref 5–17)
AST SERPL-CCNC: 23 U/L — SIGNIFICANT CHANGE UP (ref 10–40)
BILIRUB SERPL-MCNC: 0.4 MG/DL — SIGNIFICANT CHANGE UP (ref 0.2–1.2)
BUN SERPL-MCNC: 15 MG/DL — SIGNIFICANT CHANGE UP (ref 7–18)
CALCIUM SERPL-MCNC: 8.9 MG/DL — SIGNIFICANT CHANGE UP (ref 8.4–10.5)
CHLORIDE SERPL-SCNC: 111 MMOL/L — HIGH (ref 96–108)
CO2 SERPL-SCNC: 31 MMOL/L — SIGNIFICANT CHANGE UP (ref 22–31)
CREAT SERPL-MCNC: 0.61 MG/DL — SIGNIFICANT CHANGE UP (ref 0.5–1.3)
EGFR: 96 ML/MIN/1.73M2 — SIGNIFICANT CHANGE UP
GLUCOSE SERPL-MCNC: 115 MG/DL — HIGH (ref 70–99)
HCT VFR BLD CALC: 35.3 % — SIGNIFICANT CHANGE UP (ref 34.5–45)
HGB BLD-MCNC: 11 G/DL — LOW (ref 11.5–15.5)
MCHC RBC-ENTMCNC: 29 PG — SIGNIFICANT CHANGE UP (ref 27–34)
MCHC RBC-ENTMCNC: 31.2 GM/DL — LOW (ref 32–36)
MCV RBC AUTO: 93.1 FL — SIGNIFICANT CHANGE UP (ref 80–100)
NRBC # BLD: 0 /100 WBCS — SIGNIFICANT CHANGE UP (ref 0–0)
PLATELET # BLD AUTO: 141 K/UL — LOW (ref 150–400)
POTASSIUM SERPL-MCNC: 3.8 MMOL/L — SIGNIFICANT CHANGE UP (ref 3.5–5.3)
POTASSIUM SERPL-SCNC: 3.8 MMOL/L — SIGNIFICANT CHANGE UP (ref 3.5–5.3)
PROT SERPL-MCNC: 6.6 G/DL — SIGNIFICANT CHANGE UP (ref 6–8.3)
RBC # BLD: 3.79 M/UL — LOW (ref 3.8–5.2)
RBC # FLD: 12.7 % — SIGNIFICANT CHANGE UP (ref 10.3–14.5)
SODIUM SERPL-SCNC: 143 MMOL/L — SIGNIFICANT CHANGE UP (ref 135–145)
WBC # BLD: 4.21 K/UL — SIGNIFICANT CHANGE UP (ref 3.8–10.5)
WBC # FLD AUTO: 4.21 K/UL — SIGNIFICANT CHANGE UP (ref 3.8–10.5)

## 2024-02-16 PROCEDURE — 93005 ELECTROCARDIOGRAM TRACING: CPT

## 2024-02-16 PROCEDURE — 74176 CT ABD & PELVIS W/O CONTRAST: CPT | Mod: MA

## 2024-02-16 PROCEDURE — 73060 X-RAY EXAM OF HUMERUS: CPT

## 2024-02-16 PROCEDURE — 86901 BLOOD TYPING SEROLOGIC RH(D): CPT

## 2024-02-16 PROCEDURE — 99285 EMERGENCY DEPT VISIT HI MDM: CPT

## 2024-02-16 PROCEDURE — 71250 CT THORAX DX C-: CPT | Mod: MA

## 2024-02-16 PROCEDURE — 73030 X-RAY EXAM OF SHOULDER: CPT

## 2024-02-16 PROCEDURE — 85027 COMPLETE CBC AUTOMATED: CPT

## 2024-02-16 PROCEDURE — 36415 COLL VENOUS BLD VENIPUNCTURE: CPT

## 2024-02-16 PROCEDURE — 86850 RBC ANTIBODY SCREEN: CPT

## 2024-02-16 PROCEDURE — 80053 COMPREHEN METABOLIC PANEL: CPT

## 2024-02-16 PROCEDURE — 80048 BASIC METABOLIC PNL TOTAL CA: CPT

## 2024-02-16 PROCEDURE — 86900 BLOOD TYPING SEROLOGIC ABO: CPT

## 2024-02-16 PROCEDURE — 97161 PT EVAL LOW COMPLEX 20 MIN: CPT

## 2024-02-16 RX ORDER — SENNA PLUS 8.6 MG/1
2 TABLET ORAL
Qty: 0 | Refills: 0 | DISCHARGE
Start: 2024-02-16

## 2024-02-16 RX ORDER — POLYETHYLENE GLYCOL 3350 17 G/17G
17 POWDER, FOR SOLUTION ORAL
Qty: 0 | Refills: 0 | DISCHARGE
Start: 2024-02-16

## 2024-02-16 RX ORDER — MUPIROCIN 20 MG/G
1 OINTMENT TOPICAL
Refills: 0 | DISCHARGE

## 2024-02-16 RX ORDER — COLLAGENASE CLOSTRIDIUM HIST. 250 UNIT/G
1 OINTMENT (GRAM) TOPICAL
Qty: 1 | Refills: 0
Start: 2024-02-16 | End: 2024-03-16

## 2024-02-16 RX ORDER — ACETAMINOPHEN 500 MG
2 TABLET ORAL
Qty: 0 | Refills: 0 | DISCHARGE
Start: 2024-02-16

## 2024-02-16 RX ORDER — ACETAMINOPHEN 500 MG
2 TABLET ORAL
Qty: 18 | Refills: 0
Start: 2024-02-16 | End: 2024-02-18

## 2024-02-16 RX ORDER — LIDOCAINE 4 G/100G
1 CREAM TOPICAL
Qty: 5 | Refills: 0
Start: 2024-02-16 | End: 2024-03-16

## 2024-02-16 RX ORDER — GABAPENTIN 400 MG/1
1 CAPSULE ORAL
Qty: 28 | Refills: 0
Start: 2024-02-16 | End: 2024-02-29

## 2024-02-16 RX ADMIN — Medication 1000 UNIT(S): at 12:18

## 2024-02-16 RX ADMIN — ENOXAPARIN SODIUM 40 MILLIGRAM(S): 100 INJECTION SUBCUTANEOUS at 06:55

## 2024-02-16 RX ADMIN — Medication 1 APPLICATION(S): at 06:55

## 2024-02-16 RX ADMIN — GABAPENTIN 100 MILLIGRAM(S): 400 CAPSULE ORAL at 06:53

## 2024-02-16 RX ADMIN — MUPIROCIN 1 APPLICATION(S): 20 OINTMENT TOPICAL at 06:55

## 2024-02-16 RX ADMIN — PANTOPRAZOLE SODIUM 40 MILLIGRAM(S): 20 TABLET, DELAYED RELEASE ORAL at 06:55

## 2024-02-16 RX ADMIN — Medication 81 MILLIGRAM(S): at 12:20

## 2024-02-16 NOTE — CONSULT NOTE ADULT - ASSESSMENT
A:  Right distal leg superficial wound    P:   Patient evaluated and Chart reviewed   Discussed diagnosis and treatment with patient  Recommend Santyl application every other day to right leg wound  Podiatry to follow while in house  Discussed with Attending Dr. Montalvo      WC: Valentinyl, Allevyn pad to be place every other day to RLE
Confidential Drug Utilization Report  Search Terms: tony mujica, 1955Search Date: 02/14/2024 09:29:13 AM  The Drug Utilization Report below displays all of the controlled substance prescriptions, if any, that your patient has filled in the last twelve months. The information displayed on this report is compiled from pharmacy submissions to the Department, and accurately reflects the information as submitted by the pharmacies.    This report was requested by: Solange Alcocer | Reference #: [id number]    There are no results for the search terms that you entered.

## 2024-02-16 NOTE — CONSULT NOTE ADULT - SUBJECTIVE AND OBJECTIVE BOX
Pt is a 68F from [facility] ambulates with a walker w/ PMHx of ?muscular dystrophy, schizophrenia, GERD, PAD, anxiety, presenting w/ RUE pain and lower back pain s/p mechanical fall at the nursing home. Pt endorses that she has 2 falls over the weekend, once on Saturday and once on Tuesday. She stated that both times she landed on her back, and on Tuesday she also landed on her arm. No LOC and no head strike, not on AC. Denies numbness/ paresthesias in b/l LE, denies urinary or bowel incontinence. Denies dizziness, headaches, chest pain, fevers, chills, cough, sob, abdominal pain, n/v/d/constipation.     Last [facility] admission 3/2023- treated for RLE cellulitis w/ unasyn           Medications acetaminophen     Tablet .. 1000 milliGRAM(s) Oral every 8 hours  ammonium lactate 12% Lotion 1 Application(s) Topical two times a day  aspirin enteric coated 81 milliGRAM(s) Oral daily  bisacodyl 5 milliGRAM(s) Oral daily PRN  cholecalciferol 1000 Unit(s) Oral daily  enoxaparin Injectable 40 milliGRAM(s) SubCutaneous every 24 hours  gabapentin 100 milliGRAM(s) Oral two times a day  influenza  Vaccine (HIGH DOSE) 0.7 milliLiter(s) IntraMuscular once  lidocaine   4% Patch 1 Patch Transdermal daily  mupirocin 2% Ointment 1 Application(s) Topical two times a day  naloxone Injectable 0.4 milliGRAM(s) IV Push once  oxyCODONE    IR 5 milliGRAM(s) Oral every 4 hours PRN  pantoprazole    Tablet 40 milliGRAM(s) Oral before breakfast  polyethylene glycol 3350 17 Gram(s) Oral daily  senna 2 Tablet(s) Oral at bedtime    FHNo pertinent family history in first degree relatives    ,   PMHGERD (gastroesophageal reflux disease)    H/O: depression    Osteoporosis    H/O personality disorder       PSHNo significant past surgical history        Labs                          11.0   4.21  )-----------( 141      ( 16 Feb 2024 08:21 )             35.3      02-16    143  |  111<H>  |  15  ----------------------------<  115<H>  3.8   |  31  |  0.61    Ca    8.9      16 Feb 2024 08:21    TPro  6.6  /  Alb  3.1<L>  /  TBili  0.4  /  DBili  x   /  AST  23  /  ALT  21  /  AlkPhos  88  02-16     Vital Signs Last 24 Hrs  T(C): 36.7 (16 Feb 2024 05:20), Max: 36.9 (15 Feb 2024 14:14)  T(F): 98.1 (16 Feb 2024 05:20), Max: 98.4 (15 Feb 2024 14:14)  HR: 78 (16 Feb 2024 05:20) (75 - 80)  BP: 133/80 (16 Feb 2024 05:20) (114/69 - 134/80)  BP(mean): --  RR: 16 (16 Feb 2024 05:20) (16 - 16)  SpO2: 96% (16 Feb 2024 05:20) (95% - 98%)    Parameters below as of 16 Feb 2024 05:20  Patient On (Oxygen Delivery Method): room air              WBC Count: 4.21 K/uL (02-16-24 @ 08:21)      ROS: Unremarkable outside HPI        Vasc:  DP and PT pulses faintly palpable. TG: warm to cool. Multiple varicosities perimalleolar. No edema, no erythema   Derm: Right distal danielle-medial leg - fibrotic wound measuring about 3.5cmx1.6cmx0.1cm. No periwound erythema. No purulence/drainage/fluctuance  Left foot 5th digit - superficial lesion with a fibro-granular base measuring about [id number]. No periwound erythema/purulence/drainage/fluctuance  Neuro: protective sensation decreased  MSK: deferred at this time      < from: VA Physiol Extremity Lower 3+ Level, BI (03.16.23 @ 11:24) >    INTERPRETATION:  Clinical information: Nonsmoker, nondiabetic, presents   with cellulitis and nonhealing ulcer left lower extremity.    COMPARISON: None available.    TECHNIQUE: Lower extremity arterial Doppler study.    FINDINGS: Ankle brachial index measures 1.26 on the right and 1.21 on the   left. No segmental arterial pressure gradient is detected.    PVR waveforms are normal in amplitude and pulsatility throughout both   lower extremities.    IMPRESSION: No Doppler evidence of hemodynamically significant arterial   flow limitation in either lower extremity.    --- End of Report ---    < end of copied text >

## 2024-02-18 NOTE — ED ADULT NURSE NOTE - NSFALLRISKASMT_ED_ALL_ED_DT
A&O female with c/o abdominal cramping that radiates to sides and back. Discomfort began around 2000 and is accompanied with nausea.  
29-Sep-2023 06:19

## 2024-06-21 ENCOUNTER — APPOINTMENT (OUTPATIENT)
Dept: VASCULAR SURGERY | Facility: CLINIC | Age: 69
End: 2024-06-21
Payer: MEDICARE

## 2024-06-21 VITALS
HEIGHT: 70 IN | HEART RATE: 109 BPM | BODY MASS INDEX: 14.46 KG/M2 | TEMPERATURE: 98.3 F | SYSTOLIC BLOOD PRESSURE: 116 MMHG | DIASTOLIC BLOOD PRESSURE: 63 MMHG | WEIGHT: 101 LBS

## 2024-06-21 VITALS — HEART RATE: 111 BPM | SYSTOLIC BLOOD PRESSURE: 122 MMHG | DIASTOLIC BLOOD PRESSURE: 74 MMHG

## 2024-06-21 PROCEDURE — 99213 OFFICE O/P EST LOW 20 MIN: CPT

## 2024-06-21 PROCEDURE — 93971 EXTREMITY STUDY: CPT | Mod: RT

## 2024-08-06 ENCOUNTER — APPOINTMENT (OUTPATIENT)
Dept: VASCULAR SURGERY | Facility: CLINIC | Age: 69
End: 2024-08-06

## 2024-08-06 PROBLEM — E55.9 VITAMIN D DEFICIENCY: Status: ACTIVE | Noted: 2024-08-06

## 2024-08-06 PROBLEM — F41.9 ANXIETY: Status: ACTIVE | Noted: 2024-08-06

## 2024-08-06 PROBLEM — Z86.59 HISTORY OF OBSESSIVE COMPULSIVE DISORDER: Status: RESOLVED | Noted: 2024-08-06 | Resolved: 2024-08-06

## 2024-08-06 PROBLEM — I83.891 SYMPTOMATIC VARICOSE VEINS OF RIGHT LOWER EXTREMITY: Status: ACTIVE | Noted: 2024-08-06

## 2024-08-06 PROBLEM — Z86.59 HISTORY OF SCHIZOPHRENIA: Status: RESOLVED | Noted: 2024-08-06 | Resolved: 2024-08-06

## 2024-08-06 PROBLEM — Z87.2 HISTORY OF ECZEMA: Status: RESOLVED | Noted: 2024-08-06 | Resolved: 2024-08-06

## 2024-08-06 PROBLEM — Z87.39 HISTORY OF KYPHOSIS: Status: RESOLVED | Noted: 2024-08-06 | Resolved: 2024-08-06

## 2024-08-06 PROBLEM — M47.812 CERVICAL SPONDYLOSIS: Status: RESOLVED | Noted: 2024-08-06 | Resolved: 2024-08-06

## 2024-08-06 PROBLEM — Z87.19 HISTORY OF GASTROESOPHAGEAL REFLUX (GERD): Status: RESOLVED | Noted: 2024-08-06 | Resolved: 2024-08-06

## 2024-08-06 PROBLEM — G62.9 NEUROPATHY: Status: ACTIVE | Noted: 2024-08-06

## 2024-08-06 PROBLEM — F41.9 ANXIETY DISORDER: Status: ACTIVE | Noted: 2024-08-06

## 2024-08-06 PROCEDURE — 36465Z: CUSTOM | Mod: RT

## 2024-08-06 NOTE — PROCEDURE
[FreeTextEntry1] : right GSV Varithena  [FreeTextEntry3] : Procedural safety checklist and time out completed: Confirmed patient identification (Patient Name, , and/or medical record number including when possible affirmation by patient or parent/family/other). Confirmed procedure with the patient. Consent present, accurate and signed. Confirmed special equipment and supplies are present. Sterility confirmed. Position verified. Site/ side is marked and visible and confirmed. Procedure confirmed by consent. Accurate consent including side and site. Review of medical records, including venous ultrasound, noting correct procedure including site and side. MD/PA verifies presence and review of imaging studies and or written report of imaging studies. Agreement on the procedure to be performed Time out completed. All of the above has been confirmed by the team. All patient-specific concerns have been addressed.   Indication: right lower extremity varicose veins with inflammation, leg pain, leg swelling, and leg cramping.  Venous insufficiency/ reflux.   Procedure: Ultrasound guided microfoam chemical ablation with Varithena of the right distal great saphenous vein   Ms. SAVANNAH YAÑEZ is a 69 year old F with a history of right lower extremity varicose veins and venous insufficiency previously seen in the office.  Ultrasound examination demonstrated venous insufficiency. A trial of compression stockings, exercise, elevation, and pain medication was attempted without relief and definitive treatment with radiofrequency ablation was offered.   The patient has come for ultrasound guided microfoam chemical ablation with Varithena of the right distal great saphenous vein I have discussed the risks of the procedure at length with the patient. The risks discussed were inclusive of but not limited to infection, irritation at the site of infiltration of local anesthesia, and also rare risk of deep venous thrombosis and pulmonary emboli. The patient agrees to proceed with the procedure. The patient was escorted into the procedure room and a time out called.   The patient was placed on the procedure room table and was evaluated in reverse Trendelenburg position. The leg was prepped and sterile drapes applied. 1.0% lidocaine was administered at the chosen access site for local anesthesia. The vein was accessed using a 4F micro-puncture needle followed by a guide wire through the punctured needle and dilator sheath, standard IV catheters, or butterfly needle under advanced ultrasound guidance. Vein access was established and confirmed by aspiration of dark low pressure blood. After gaining access at medial ankle, the leg was positioned at 45 degrees of elevation in relationship to the torso. The Varithena canister was primed and purged as required by the instructions. A 2 mL aliquot was drawn into a sterile syringe then attached to the access device.   Varithena was administered at 1.0 cc per second with close observation under ultrasound in its course of the GSV distal and proximal calf. The vein was observed for spasm under ultrasound, once vein was in full spasm the administration of Varithena was stopped   After the administration of Varithena the patient was asked to dorsiflex the ankle to limit flow of foam into perforating veins. Once appropriate spasm had been confirmed in the treated veins, the access device was removed from the leg and light pressure was applied to the puncture site for hemostasis.   The common femoral and deep superficial veins were then evaluated for flow and compressibility prior to dressing placement. The lower extremity was kept elevated at 45 degree angle and a multilayer dressing was applied including an under layer, compression pad, and thigh high 20-30 mmHg compression stocking to the patient. The leg was lowered only after compression had been applied.   The patient ambulated 10 minutes under supervision and was without concerns at time of release. The patient was instructed to take an anti-inflammatory medicine as needed and to follow up for duplex scan of treated vein.   Patient tolerated procedure, was given post-procedure instructions and a follow-up appt scheduled. Post-care instructions include walking, wearing compression during the day, taking off only to shower and then reapplying for two weeks, advising patient to keep post-treatment bandages in place and dry for 48 hours, avoid extended periods of inactivity, avoid heavy exercise for one week, to walk daily for 10 minutes over the next month. The patient was instructed to take an anti-inflammatory medicine as needed.  Serial # [id number] exp 2025

## 2024-08-13 ENCOUNTER — APPOINTMENT (OUTPATIENT)
Dept: VASCULAR SURGERY | Facility: CLINIC | Age: 69
End: 2024-08-13
Payer: MEDICARE

## 2024-08-13 PROCEDURE — 93971 EXTREMITY STUDY: CPT | Mod: RT

## 2024-09-13 ENCOUNTER — APPOINTMENT (OUTPATIENT)
Dept: VASCULAR SURGERY | Facility: CLINIC | Age: 69
End: 2024-09-13
Payer: MEDICARE

## 2024-09-13 VITALS
HEIGHT: 70 IN | HEART RATE: 106 BPM | WEIGHT: 98 LBS | TEMPERATURE: 98.4 F | BODY MASS INDEX: 14.03 KG/M2 | DIASTOLIC BLOOD PRESSURE: 84 MMHG | SYSTOLIC BLOOD PRESSURE: 148 MMHG

## 2024-09-13 VITALS — SYSTOLIC BLOOD PRESSURE: 118 MMHG | HEART RATE: 98 BPM | DIASTOLIC BLOOD PRESSURE: 83 MMHG

## 2024-09-13 DIAGNOSIS — I83.891 VARICOSE VEINS OF RIGHT LOWER EXTREMITY WITH OTHER COMPLICATIONS: ICD-10-CM

## 2024-09-13 DIAGNOSIS — I87.2 VENOUS INSUFFICIENCY (CHRONIC) (PERIPHERAL): ICD-10-CM

## 2024-09-13 PROCEDURE — 99212 OFFICE O/P EST SF 10 MIN: CPT

## 2024-09-13 NOTE — ASSESSMENT
[Arterial/Venous Disease] : arterial/venous disease [Other: _____] : [unfilled] [FreeTextEntry1] : Impression - venous insufficiency s/p right distal GSV varithena, RLE venous wounds healed   Plan Conservative medical management - leg elevation, calf muscle exercises, knee high 15-20 mm hg compression stockings (rx given), moisturize skin, exercise regimen Return to office in 6 months March 2025 to evaluate bilateral lower extremities s/o venous insuff

## 2024-09-13 NOTE — PHYSICAL EXAM
[2+] : left 2+ [Varicose Veins Of Lower Extremities] : bilaterally [] : bilaterally [Ankle Swelling On The Right] : mild [No Rash or Lesion] : No rash or lesion [Alert] : alert [Oriented to Person] : oriented to person [Oriented to Place] : oriented to place [Oriented to Time] : oriented to time [Calm] : calm [Ankle Swelling (On Exam)] : not present [de-identified] : NAD [de-identified] : NCAT [de-identified] : unlabored breathing [FreeTextEntry1] : bilateral lower extremities soft, warm and nontender muscle atrophy in bilateral legs venous stasis changes with dry skin and hyperpigmentation no leg edema RLE wounds healed no wounds or ulcers

## 2024-09-13 NOTE — HISTORY OF PRESENT ILLNESS
[FreeTextEntry1] : Pt presents to the office to evaluate RLE wounds. History of venous insufficiency and RLE venous stasis wounds. She is s/p right GSV varithena on 8/6/24. RLE wounds have healed. Denies leg pain, swelling or discomfort. She lives in a living assisted facility. She does not wear compression stockings but wears ace wraps. Denies claudication, rest pain or wounds.

## 2024-09-24 NOTE — PATIENT PROFILE ADULT - FALL HARM RISK - FACTORS
Call was placed back to patient informing him to arrive to his surgery tomorrow for 10 AM. Pre OP INSTRUCTIONS WAS REVISED patient STATED HE ALREADY READ THEM THE FIRST TIME I READ THEM TO HIM HE UNDERSTOOD. He was aware of where to report to for Surgery and exact time time was given.  
Impaired gait/Weakness

## 2024-12-20 NOTE — ED ADULT TRIAGE NOTE - HEIGHT IN FEET
Anesthesia Evaluation     Patient summary reviewed and Nursing notes reviewed   NPO Solid Status: > 8 hours  NPO Liquid Status: > 2 hours           Airway   Mallampati: II  TM distance: >3 FB  Possible difficult intubation and No difficulty expected  Dental      Pulmonary    (+) a smoker Former,sleep apnea, decreased breath sounds  Cardiovascular     PT is on anticoagulation therapy    (+) hypertension, past MI , CAD, hyperlipidemia      Neuro/Psych  GI/Hepatic/Renal/Endo    (+) obesity, morbid obesity, GERD    Musculoskeletal     (+) arthralgias, back pain, chronic pain, myalgias  Abdominal   (+) obese   Substance History      OB/GYN          Other      history of cancer    ROS/Med Hx Other: Labs reviewed                 Anesthesia Plan    ASA 3     MAC     (Risks and benefits discussed including risk of aspiration, recall and dental damage. Discussed risks with pt., pt increased  intraop and postop risks for cv, resp, and neuro events,All patient questions answered.    Will continue with plan of care.)  intravenous induction     Anesthetic plan, risks, benefits, and alternatives have been provided, discussed and informed consent has been obtained with: patient.  Pre-procedure education provided      CODE STATUS:          5

## 2025-03-14 ENCOUNTER — APPOINTMENT (OUTPATIENT)
Dept: VASCULAR SURGERY | Facility: CLINIC | Age: 70
End: 2025-03-14
Payer: MEDICARE

## 2025-03-14 VITALS
SYSTOLIC BLOOD PRESSURE: 133 MMHG | DIASTOLIC BLOOD PRESSURE: 82 MMHG | HEART RATE: 64 BPM | TEMPERATURE: 97.8 F | HEIGHT: 70 IN | BODY MASS INDEX: 14.17 KG/M2 | WEIGHT: 99 LBS

## 2025-03-14 DIAGNOSIS — I87.2 VENOUS INSUFFICIENCY (CHRONIC) (PERIPHERAL): ICD-10-CM

## 2025-03-14 PROCEDURE — 99213 OFFICE O/P EST LOW 20 MIN: CPT

## 2025-04-01 NOTE — H&P ADULT - PROBLEM SELECTOR PLAN 2
Continue same medications for now.  Continue to follow-up with neurologist.    Orders:  •  lisinopril (ZESTRIL) 20 mg tablet; Take 1 tablet (20 mg total) by mouth daily  •  amLODIPine (NORVASC) 5 mg tablet; Take 1 tablet (5 mg total) by mouth daily  •  atorvastatin (LIPITOR) 40 mg tablet; Take 1 tablet (40 mg total) by mouth daily   - hx of PAD, with hx of leg wounds in the past   - prior RLE wound well healed, no signs of acute cellulitis   - continue to monitor - hx of PAD, with hx of leg wounds in the past   - prior RLE wound well healed, no signs of acute cellulitis   - continue w/ asa

## 2025-09-05 ENCOUNTER — EMERGENCY (EMERGENCY)
Facility: HOSPITAL | Age: 70
LOS: 1 days | End: 2025-09-05
Attending: EMERGENCY MEDICINE
Payer: MEDICARE

## 2025-09-05 VITALS — TEMPERATURE: 99 F | HEART RATE: 95 BPM | OXYGEN SATURATION: 94 % | RESPIRATION RATE: 16 BRPM

## 2025-09-05 VITALS
SYSTOLIC BLOOD PRESSURE: 113 MMHG | RESPIRATION RATE: 16 BRPM | HEART RATE: 54 BPM | TEMPERATURE: 98 F | DIASTOLIC BLOOD PRESSURE: 64 MMHG | HEIGHT: 70 IN | WEIGHT: 95.02 LBS | OXYGEN SATURATION: 95 %

## 2025-09-05 LAB
ALBUMIN SERPL ELPH-MCNC: 2.4 G/DL — LOW (ref 3.5–5)
ALP SERPL-CCNC: 142 U/L — HIGH (ref 40–120)
ALT FLD-CCNC: 32 U/L DA — SIGNIFICANT CHANGE UP (ref 10–60)
ANION GAP SERPL CALC-SCNC: 6 MMOL/L — SIGNIFICANT CHANGE UP (ref 5–17)
AST SERPL-CCNC: 28 U/L — SIGNIFICANT CHANGE UP (ref 10–40)
BASOPHILS # BLD AUTO: 0.01 K/UL — SIGNIFICANT CHANGE UP (ref 0–0.2)
BASOPHILS NFR BLD AUTO: 0.1 % — SIGNIFICANT CHANGE UP (ref 0–2)
BILIRUB SERPL-MCNC: 0.7 MG/DL — SIGNIFICANT CHANGE UP (ref 0.2–1.2)
BUN SERPL-MCNC: 38 MG/DL — HIGH (ref 7–18)
CALCIUM SERPL-MCNC: 8.5 MG/DL — SIGNIFICANT CHANGE UP (ref 8.4–10.5)
CHLORIDE SERPL-SCNC: 109 MMOL/L — HIGH (ref 96–108)
CO2 SERPL-SCNC: 33 MMOL/L — HIGH (ref 22–31)
CREAT SERPL-MCNC: 0.84 MG/DL — SIGNIFICANT CHANGE UP (ref 0.5–1.3)
EGFR: 74 ML/MIN/1.73M2 — SIGNIFICANT CHANGE UP
EGFR: 74 ML/MIN/1.73M2 — SIGNIFICANT CHANGE UP
EOSINOPHIL # BLD AUTO: 0 K/UL — SIGNIFICANT CHANGE UP (ref 0–0.5)
EOSINOPHIL NFR BLD AUTO: 0 % — SIGNIFICANT CHANGE UP (ref 0–6)
GLUCOSE SERPL-MCNC: 125 MG/DL — HIGH (ref 70–99)
HCT VFR BLD CALC: 40.3 % — SIGNIFICANT CHANGE UP (ref 34.5–45)
HGB BLD-MCNC: 12.2 G/DL — SIGNIFICANT CHANGE UP (ref 11.5–15.5)
IMM GRANULOCYTES NFR BLD AUTO: 0.6 % — SIGNIFICANT CHANGE UP (ref 0–0.9)
LYMPHOCYTES # BLD AUTO: 0.39 K/UL — LOW (ref 1–3.3)
LYMPHOCYTES # BLD AUTO: 3.9 % — LOW (ref 13–44)
MANUAL SMEAR VERIFICATION: SIGNIFICANT CHANGE UP
MCHC RBC-ENTMCNC: 28.9 PG — SIGNIFICANT CHANGE UP (ref 27–34)
MCHC RBC-ENTMCNC: 30.3 G/DL — LOW (ref 32–36)
MCV RBC AUTO: 95.5 FL — SIGNIFICANT CHANGE UP (ref 80–100)
MONOCYTES # BLD AUTO: 0.2 K/UL — SIGNIFICANT CHANGE UP (ref 0–0.9)
MONOCYTES NFR BLD AUTO: 2 % — SIGNIFICANT CHANGE UP (ref 2–14)
NEUTROPHILS # BLD AUTO: 9.39 K/UL — HIGH (ref 1.8–7.4)
NEUTROPHILS NFR BLD AUTO: 93.4 % — HIGH (ref 43–77)
NRBC BLD AUTO-RTO: 0 /100 WBCS — SIGNIFICANT CHANGE UP (ref 0–0)
PLAT MORPH BLD: NORMAL — SIGNIFICANT CHANGE UP
PLATELET # BLD AUTO: 285 K/UL — SIGNIFICANT CHANGE UP (ref 150–400)
POTASSIUM SERPL-MCNC: 3.3 MMOL/L — LOW (ref 3.5–5.3)
POTASSIUM SERPL-SCNC: 3.3 MMOL/L — LOW (ref 3.5–5.3)
PROT SERPL-MCNC: 6.1 G/DL — SIGNIFICANT CHANGE UP (ref 6–8.3)
RBC # BLD: 4.22 M/UL — SIGNIFICANT CHANGE UP (ref 3.8–5.2)
RBC # FLD: 14 % — SIGNIFICANT CHANGE UP (ref 10.3–14.5)
RBC BLD AUTO: NORMAL — SIGNIFICANT CHANGE UP
SODIUM SERPL-SCNC: 148 MMOL/L — HIGH (ref 135–145)
TROPONIN I, HIGH SENSITIVITY RESULT: 40.4 NG/L — SIGNIFICANT CHANGE UP
WBC # BLD: 10.05 K/UL — SIGNIFICANT CHANGE UP (ref 3.8–10.5)
WBC # FLD AUTO: 10.05 K/UL — SIGNIFICANT CHANGE UP (ref 3.8–10.5)

## 2025-09-05 PROCEDURE — 93010 ELECTROCARDIOGRAM REPORT: CPT | Mod: 76

## 2025-09-05 PROCEDURE — 96374 THER/PROPH/DIAG INJ IV PUSH: CPT

## 2025-09-05 PROCEDURE — 36415 COLL VENOUS BLD VENIPUNCTURE: CPT

## 2025-09-05 PROCEDURE — 71250 CT THORAX DX C-: CPT | Mod: 26

## 2025-09-05 PROCEDURE — 85025 COMPLETE CBC W/AUTO DIFF WBC: CPT

## 2025-09-05 PROCEDURE — 84484 ASSAY OF TROPONIN QUANT: CPT

## 2025-09-05 PROCEDURE — 99285 EMERGENCY DEPT VISIT HI MDM: CPT

## 2025-09-05 PROCEDURE — 71250 CT THORAX DX C-: CPT

## 2025-09-05 PROCEDURE — 93005 ELECTROCARDIOGRAM TRACING: CPT

## 2025-09-05 PROCEDURE — 80053 COMPREHEN METABOLIC PANEL: CPT

## 2025-09-05 PROCEDURE — 99285 EMERGENCY DEPT VISIT HI MDM: CPT | Mod: 25

## 2025-09-05 RX ORDER — ACETAMINOPHEN 500 MG/5ML
650 LIQUID (ML) ORAL ONCE
Refills: 0 | Status: COMPLETED | OUTPATIENT
Start: 2025-09-05 | End: 2025-09-05

## 2025-09-05 RX ADMIN — Medication 260 MILLIGRAM(S): at 18:44

## 2025-09-05 RX ADMIN — Medication 650 MILLIGRAM(S): at 19:28

## 2025-09-05 RX ADMIN — Medication 40 MILLIEQUIVALENT(S): at 19:09
